# Patient Record
Sex: MALE | Race: BLACK OR AFRICAN AMERICAN | Employment: FULL TIME | URBAN - METROPOLITAN AREA
[De-identification: names, ages, dates, MRNs, and addresses within clinical notes are randomized per-mention and may not be internally consistent; named-entity substitution may affect disease eponyms.]

---

## 2017-08-31 ENCOUNTER — OFFICE VISIT (OUTPATIENT)
Dept: CARDIOLOGY CLINIC | Age: 27
End: 2017-08-31

## 2017-08-31 VITALS
HEART RATE: 60 BPM | WEIGHT: 239.4 LBS | SYSTOLIC BLOOD PRESSURE: 114 MMHG | DIASTOLIC BLOOD PRESSURE: 80 MMHG | BODY MASS INDEX: 32.43 KG/M2 | HEIGHT: 72 IN | OXYGEN SATURATION: 97 % | RESPIRATION RATE: 18 BRPM

## 2017-08-31 DIAGNOSIS — I45.6 WPW (WOLFF-PARKINSON-WHITE SYNDROME): Primary | Chronic | ICD-10-CM

## 2017-08-31 DIAGNOSIS — R00.2 PALPITATIONS: ICD-10-CM

## 2017-08-31 DIAGNOSIS — R06.02 SOB (SHORTNESS OF BREATH): ICD-10-CM

## 2017-08-31 NOTE — PROGRESS NOTES
Subjective:      Earline Yadav is a 23 y.o. male is here for EP consult. He has a hx of WPW with ablation in 2014. He has been doing generally well until recently. He has been having episodes of dizziness with rapid heart beat. He feels palpitations that feel like a racing beat, going really fast. He has felt dizzy, held his breath and it seemed to slow things down. He gets a weird pressure in his chest at times. He has sob associated with this pressure. The patient denies orthopnea, PND, LE edema, syncope, presyncope or fatigue. Patient Active Problem List    Diagnosis Date Noted    SOB (shortness of breath) 08/31/2017    Paroxysmal supraventricular tachycardia ([address] 07/02/2012    Other dyspnea and respiratory abnormality 07/02/2012    WPW (Hunter-Parkinson-White syndrome) 05/31/2012    Angina at rest [facility]) 05/31/2012      None  Past Medical History:   Diagnosis Date    Angina at rest [facility])     WPW (Hunter-Parkinson-White syndrome)       Past Surgical History:   Procedure Laterality Date    CARDIAC SURG PROCEDURE UNLIST  2014    Cardiac ablation ([facility] WPW)     No Known Allergies   Family History   Problem Relation Age of Onset    Arrhythmia Father     negative for cardiac disease  Social History     Social History    Marital status: SINGLE     Spouse name: N/A    Number of children: N/A    Years of education: N/A     Social History Main Topics    Smoking status: Never Smoker    Smokeless tobacco: Never Used      Comment: 1 CIGAR DAILY    Alcohol use Yes      Comment: occasional    Drug use: Yes     Special: Marijuana      Comment: stated he smokes everyday, twice a day    Sexual activity: Yes     Partners: Female     Birth control/ protection: Condom     Other Topics Concern    None     Social History Narrative     Current Outpatient Prescriptions   Medication Sig    HYDROcodone-acetaminophen (NORCO) 5-325 mg per tablet Take 1 Tab by mouth every four (4) hours as needed for Pain.  Max Daily Amount: 6 Tabs.  chlorhexidine (PERIDEX) 0.12 % solution 15 mL by Swish and Spit route two (2) times a day. No current facility-administered medications for this visit. Vitals:    08/31/17 1007 08/31/17 1019 08/31/17 1020   BP: 104/60 110/76 114/80   Pulse: 60     Resp: 18     SpO2: 97%     Weight: 239 lb 6.4 oz (108.6 kg)     Height: 6' (1.829 m)         I have reviewed the nurses notes, vitals, problem list, allergy list, medical history, family, social history and medications. Review of Symptoms:    General: Pt denies excessive weight gain or loss. Pt is able to conduct ADL's  HEENT: Denies blurred vision, headaches, epistaxis and difficulty swallowing. Respiratory: + shortness of breath, denies CHRISTIANSON, wheezing or stridor. Cardiovascular: +chest pressure, Denies precordial pain, +palpitations, denies edema or PND  Gastrointestinal: Denies poor appetite, indigestion, abdominal pain or blood in stool  Urinary: Denies dysuria, pyuria  Musculoskeletal: Denies pain or swelling from muscles or joints  Neurologic: Denies tremor, paresthesias, +dizziness  Skin: Denies rash, itching or texture change. Psych: Denies depression      Physical Exam:      General: Well developed, in no acute distress. HEENT: Eyes - PERRL, no jvd  Heart:  Normal S1/S2 negative S3 or S4. Regular, no murmur, gallop or rub.   Respiratory: Clear bilaterally x 4, no wheezing or rales  Abdomen:   Soft, non-tender, bowel sounds are active.   Extremities:  No edema, normal cap refill, no cyanosis. Musculoskeletal: No clubbing  Neuro: A&Ox3, speech clear, gait stable. Skin: Skin color is normal. No rashes or lesions.  Non diaphoretic  Vascular: 2+ pulses symmetric in all extremities    Cardiographics    Ekg: Sinus  Rhythm HR 65  wpw pattern    Results for orders placed or performed during the hospital encounter of 08/20/14   EKG, 12 LEAD, INITIAL   Result Value Ref Range    Ventricular Rate 75 BPM    Atrial Rate 75 BPM    P-R Interval 122 ms    QRS Duration 88 ms    Q-T Interval 388 ms    QTC Calculation (Bezet) 433 ms    Calculated P Axis 67 degrees    Calculated R Axis 75 degrees    Calculated T Axis 35 degrees    Diagnosis       Normal sinus rhythm  Normal ECG  When compared with ECG of 25-JUN-2014 07:21,  QRS duration has decreased  Confirmed by Darby Dougherty PBERNY ([id number]) on 8/21/2014 12:37:49 PM         Lab Results   Component Value Date/Time    WBC 8.1 08/20/2014 09:48 PM    HGB 12.7 08/20/2014 09:48 PM    HCT 36.9 08/20/2014 09:48 PM    PLATELET 400 50/59/3394 09:48 PM    MCV 83.3 08/20/2014 09:48 PM      Lab Results   Component Value Date/Time    Sodium 140 08/20/2014 09:48 PM    Potassium 3.9 08/20/2014 09:48 PM    Chloride 107 08/20/2014 09:48 PM    CO2 28 08/20/2014 09:48 PM    Anion gap 5 08/20/2014 09:48 PM    Glucose 91 08/20/2014 09:48 PM    BUN 9 08/20/2014 09:48 PM    Creatinine 0.99 08/20/2014 09:48 PM    BUN/Creatinine ratio 9 08/20/2014 09:48 PM    GFR est AA >60 08/20/2014 09:48 PM    GFR est non-AA >60 08/20/2014 09:48 PM    Calcium 8.6 08/20/2014 09:48 PM    Bilirubin, total 0.6 08/20/2014 09:48 PM    AST (SGOT) 24 08/20/2014 09:48 PM    Alk. phosphatase 72 08/20/2014 09:48 PM    Protein, total 7.1 08/20/2014 09:48 PM    Albumin 4.0 08/20/2014 09:48 PM    Globulin 3.1 08/20/2014 09:48 PM    A-G Ratio 1.3 08/20/2014 09:48 PM    ALT (SGPT) 37 08/20/2014 09:48 PM         Assessment:     Assessment:        ICD-10-CM ICD-9-CM    1. WPW (Hunter-Parkinson-White syndrome) I45.6 426.7 AMB POC EKG ROUTINE W/ 12 LEADS, INTER & REP      CBC WITH AUTOMATED DIFF      METABOLIC PANEL, COMPREHENSIVE      PROTHROMBIN TIME + INR   2. SOB (shortness of breath) R06.02 786.05 AMB POC EKG ROUTINE W/ 12 LEADS, INTER & REP      CBC WITH AUTOMATED DIFF      METABOLIC PANEL, COMPREHENSIVE      PROTHROMBIN TIME + INR   3.  Palpitations R00.2 785.1 AMB POC EKG ROUTINE W/ 12 LEADS, INTER & REP      CBC WITH AUTOMATED DIFF      METABOLIC PANEL, COMPREHENSIVE      PROTHROMBIN TIME + INR     Orders Placed This Encounter    CBC WITH AUTOMATED DIFF    METABOLIC PANEL, COMPREHENSIVE    PROTHROMBIN TIME + INR    AMB POC EKG ROUTINE W/ 12 LEADS, INTER & REP     Order Specific Question:   Reason for Exam:     Answer:   routine        Plan:   Mr Shanta Marquez is here today for consultation regarding rapid palpitations associated with dizziness, sob and chest pressure. He has a hx of WPW with successful ablation in 2014. His ekg today is showing a WPW pattern. He is a candidate for a redo ablation. I discussed the risks/benefits/alternatives of the procedure with the patient. Risks include (but are not limited to) bleeding, infection, cva/mi/tamponade/death. The patient understands and agrees to proceed. Thank you for this interesting consultation. Thank you for allowing me to participate in Rut Black 's care.     Gabriel Rondon MD, Natalee Rodriguez

## 2017-08-31 NOTE — PROGRESS NOTES
Chief Complaint   Patient presents with    Shortness of Breath     SOB and palps. C/o lightheadedness.

## 2017-08-31 NOTE — MR AVS SNAPSHOT
Visit Information Date & Time Provider Department Dept. Phone Encounter #  
 8/31/2017  9:45 AM He Iniguez, [address] [facility] [phone] [id number] Upcoming Health Maintenance Date Due DTaP/Tdap/Td series (1 - Tdap) 11/13/2012 INFLUENZA AGE 9 TO ADULT 8/1/2017 Allergies as of 8/31/2017  Review Complete On: 8/31/2017 By: Reva Cifuentes NP No Known Allergies Current Immunizations  Never Reviewed No immunizations on file. Not reviewed this visit You Were Diagnosed With   
  
 Codes Comments WPW (Hunter-Parkinson-White syndrome)    -  Primary ICD-10-CM: I45.6 ICD-9-CM: 426.7 SOB (shortness of breath)     ICD-10-CM: R06.02 
ICD-9-CM: 786.05 Palpitations     ICD-10-CM: R00.2 ICD-9-CM: 785.1 Vitals BP Pulse Resp Height(growth percentile) Weight(growth percentile) SpO2  
 114/80 (BP 1 Location: Right arm, BP Patient Position: Standing) 60 18 6' (1.829 m) 239 lb 6.4 oz (108.6 kg) 97% BMI Smoking Status 32.47 kg/m2 Never Smoker Vitals History BMI and BSA Data Body Mass Index Body Surface Area  
 32.47 kg/m 2 2.35 m 2 Your Updated Medication List  
  
   
This list is accurate as of: 8/31/17 10:46 AM.  Always use your most recent med list.  
  
  
  
  
 chlorhexidine 0.12 % solution Commonly known as:  PERIDEX 15 mL by Swish and Spit route two (2) times a day. HYDROcodone-acetaminophen 5-325 mg per tablet Commonly known as:  Rhenda Cunningham Take 1 Tab by mouth every four (4) hours as needed for Pain. Max Daily Amount: 6 Tabs. We Performed the Following AMB POC EKG ROUTINE W/ 12 LEADS, INTER & REP [62368 CPT(R)] CBC WITH AUTOMATED DIFF [13413 CPT(R)] METABOLIC PANEL, COMPREHENSIVE [36890 CPT(R)] PROTHROMBIN TIME + INR [59463 CPT(R)] Introducing [facility]!    
 [facility] introduces [employer] patient portal. Now you can access parts of your medical record, email your doctor's office, and request medication refills online. 1. In your internet browser, go to [email or web address] 2. Click on the First Time User? Click Here link in the Sign In box. You will see the New Member Sign Up page. 3. Enter your [employer] Access Code exactly as it appears below. You will not need to use this code after youve completed the sign-up process. If you do not sign up before the expiration date, you must request a new code. · [employer] Access Code: [email or web address] Expires: 11/29/2017  8:30 AM 
 
4. Enter the last four digits of your Social Security Number (xxxx) and Date of Birth (mm/dd/yyyy) as indicated and click Submit. You will be taken to the next sign-up page. 5. Create a [employer] ID. This will be your [employer] login ID and cannot be changed, so think of one that is secure and easy to remember. 6. Create a [employer] password. You can change your password at any time. 7. Enter your Password Reset Question and Answer. This can be used at a later time if you forget your password. 8. Enter your e-mail address. You will receive e-mail notification when new information is available in [address] 9. Click Sign Up. You can now view and download portions of your medical record. 10. Click the Download Summary menu link to download a portable copy of your medical information. If you have questions, please visit the Frequently Asked Questions section of the [employer] website. Remember, [employer] is NOT to be used for urgent needs. For medical emergencies, dial 911. Now available from your iPhone and Android! Please provide this summary of care documentation to your next provider. Your primary care clinician is listed as NONE. If you have any questions after today's visit, please call [phone].

## 2017-09-06 LAB
ALBUMIN SERPL-MCNC: 4.3 G/DL (ref 3.5–5.5)
ALBUMIN/GLOB SERPL: 1.6 {RATIO} (ref 1.2–2.2)
ALP SERPL-CCNC: 66 IU/L (ref 39–117)
ALT SERPL-CCNC: 23 IU/L (ref 0–44)
AST SERPL-CCNC: 18 IU/L (ref 0–40)
BASOPHILS # BLD AUTO: 0 X10E3/UL (ref 0–0.2)
BASOPHILS NFR BLD AUTO: 0 %
BILIRUB SERPL-MCNC: 0.7 MG/DL (ref 0–1.2)
BUN SERPL-MCNC: 10 MG/DL (ref 6–20)
BUN/CREAT SERPL: 10 (ref 9–20)
CALCIUM SERPL-MCNC: 9.2 MG/DL (ref 8.7–10.2)
CHLORIDE SERPL-SCNC: 102 MMOL/L (ref 96–106)
CO2 SERPL-SCNC: 23 MMOL/L (ref 18–29)
CREAT SERPL-MCNC: 1.04 MG/DL (ref 0.76–1.27)
EOSINOPHIL # BLD AUTO: 0.1 X10E3/UL (ref 0–0.4)
EOSINOPHIL NFR BLD AUTO: 2 %
ERYTHROCYTE [DISTWIDTH] IN BLOOD BY AUTOMATED COUNT: 12.6 % (ref 12.3–15.4)
GLOBULIN SER CALC-MCNC: 2.7 G/DL (ref 1.5–4.5)
GLUCOSE SERPL-MCNC: 118 MG/DL (ref 65–99)
HCT VFR BLD AUTO: 41.7 % (ref 37.5–51)
HGB BLD-MCNC: 13.7 G/DL (ref 12.6–17.7)
IMM GRANULOCYTES # BLD: 0 X10E3/UL (ref 0–0.1)
IMM GRANULOCYTES NFR BLD: 0 %
INR PPP: 1.1 (ref 0.8–1.2)
LYMPHOCYTES # BLD AUTO: 2.7 X10E3/UL (ref 0.7–3.1)
LYMPHOCYTES NFR BLD AUTO: 40 %
MCH RBC QN AUTO: 28.9 PG (ref 26.6–33)
MCHC RBC AUTO-ENTMCNC: 32.9 G/DL (ref 31.5–35.7)
MCV RBC AUTO: 88 FL (ref 79–97)
MONOCYTES # BLD AUTO: 0.4 X10E3/UL (ref 0.1–0.9)
MONOCYTES NFR BLD AUTO: 6 %
NEUTROPHILS # BLD AUTO: 3.5 X10E3/UL (ref 1.4–7)
NEUTROPHILS NFR BLD AUTO: 52 %
PLATELET # BLD AUTO: 293 X10E3/UL (ref 150–379)
POTASSIUM SERPL-SCNC: 4.2 MMOL/L (ref 3.5–5.2)
PROT SERPL-MCNC: 7 G/DL (ref 6–8.5)
PROTHROMBIN TIME: 11.8 SEC (ref 9.1–12)
RBC # BLD AUTO: 4.74 X10E6/UL (ref 4.14–5.8)
SODIUM SERPL-SCNC: 139 MMOL/L (ref 134–144)
WBC # BLD AUTO: 6.8 X10E3/UL (ref 3.4–10.8)

## 2017-09-11 ENCOUNTER — ANESTHESIA (OUTPATIENT)
Dept: NON INVASIVE DIAGNOSTICS | Age: 27
End: 2017-09-11
Payer: SELF-PAY

## 2017-09-11 ENCOUNTER — ANESTHESIA EVENT (OUTPATIENT)
Dept: NON INVASIVE DIAGNOSTICS | Age: 27
End: 2017-09-11
Payer: SELF-PAY

## 2017-09-11 ENCOUNTER — HOSPITAL ENCOUNTER (OUTPATIENT)
Dept: NON INVASIVE DIAGNOSTICS | Age: 27
Setting detail: OBSERVATION
Discharge: HOME | End: 2017-09-12
Attending: INTERNAL MEDICINE | Admitting: INTERNAL MEDICINE
Payer: SELF-PAY

## 2017-09-11 PROBLEM — I47.1 SVT (SUPRAVENTRICULAR TACHYCARDIA) (HCC): Status: ACTIVE | Noted: 2017-09-11

## 2017-09-11 LAB
ACT BLD: 131 SECS (ref 79–138)
ACT BLD: 329 SECS (ref 79–138)
ACT BLD: 367 SECS (ref 79–138)
ACT BLD: 389 SECS (ref 79–138)

## 2017-09-11 PROCEDURE — C2630 CATH, EP, COOL-TIP: HCPCS

## 2017-09-11 PROCEDURE — 74011250636 HC RX REV CODE- 250/636

## 2017-09-11 PROCEDURE — 77030013797 HC KT TRNSDUC PRSSR EDWD -A

## 2017-09-11 PROCEDURE — 74011000250 HC RX REV CODE- 250

## 2017-09-11 PROCEDURE — 77030030806 HC PTCH ENSIT NAVX STJU -G

## 2017-09-11 PROCEDURE — 77030025143 HC CBL EP SUPRME 1 STJU -C

## 2017-09-11 PROCEDURE — C1894 INTRO/SHEATH, NON-LASER: HCPCS

## 2017-09-11 PROCEDURE — 77030004964 HC CBL EP ABLAT BSC -C

## 2017-09-11 PROCEDURE — 77030029065 HC DRSG HEMO QCLOT ZMED -B

## 2017-09-11 PROCEDURE — 77030033352 HC TBNG IRR PMP COOL PNT STJU -B

## 2017-09-11 PROCEDURE — 77030016898 HC CBL EP EXT3 STJU -B

## 2017-09-11 PROCEDURE — 74011250637 HC RX REV CODE- 250/637: Performed by: INTERNAL MEDICINE

## 2017-09-11 PROCEDURE — 85347 COAGULATION TIME ACTIVATED: CPT

## 2017-09-11 PROCEDURE — 77030010880 HC CBL EP SUPRME STJU -C

## 2017-09-11 PROCEDURE — 99218 HC RM OBSERVATION: CPT

## 2017-09-11 PROCEDURE — C1759 CATH, INTRA ECHOCARDIOGRAPHY: HCPCS

## 2017-09-11 PROCEDURE — 77030011640 HC PAD GRND REM COVD -A

## 2017-09-11 PROCEDURE — C1730 CATH, EP, 19 OR FEW ELECT: HCPCS

## 2017-09-11 PROCEDURE — 74011250636 HC RX REV CODE- 250/636: Performed by: INTERNAL MEDICINE

## 2017-09-11 PROCEDURE — 77030016894 HC CBL EP DX CATH3 STJU -B

## 2017-09-11 PROCEDURE — 76060000037 HC ANESTHESIA 3 TO 3.5 HR

## 2017-09-11 PROCEDURE — 93613 INTRACARDIAC EPHYS 3D MAPG: CPT

## 2017-09-11 PROCEDURE — C1769 GUIDE WIRE: HCPCS

## 2017-09-11 PROCEDURE — 77030018729 HC ELECTRD DEFIB PAD CARD -B

## 2017-09-11 PROCEDURE — 93655 ICAR CATH ABLTJ DSCRT ARRHYT: CPT

## 2017-09-11 PROCEDURE — C1766 INTRO/SHEATH,STRBLE,NON-PEEL: HCPCS

## 2017-09-11 PROCEDURE — 93656 COMPRE EP EVAL ABLTJ ATR FIB: CPT

## 2017-09-11 PROCEDURE — C1893 INTRO/SHEATH, FIXED,NON-PEEL: HCPCS

## 2017-09-11 RX ORDER — METOPROLOL SUCCINATE 25 MG/1
12.5 TABLET, EXTENDED RELEASE ORAL DAILY
Qty: 15 TAB | Refills: 2 | Status: SHIPPED | OUTPATIENT
Start: 2017-09-11 | End: 2017-09-13

## 2017-09-11 RX ORDER — PROPOFOL 10 MG/ML
INJECTION, EMULSION INTRAVENOUS AS NEEDED
Status: DISCONTINUED | OUTPATIENT
Start: 2017-09-11 | End: 2017-09-11 | Stop reason: HOSPADM

## 2017-09-11 RX ORDER — PROTAMINE SULFATE 10 MG/ML
INJECTION, SOLUTION INTRAVENOUS AS NEEDED
Status: DISCONTINUED | OUTPATIENT
Start: 2017-09-11 | End: 2017-09-11 | Stop reason: HOSPADM

## 2017-09-11 RX ORDER — MIDAZOLAM HYDROCHLORIDE 1 MG/ML
1-5 INJECTION, SOLUTION INTRAMUSCULAR; INTRAVENOUS
Status: DISCONTINUED | OUTPATIENT
Start: 2017-09-11 | End: 2017-09-11

## 2017-09-11 RX ORDER — FENTANYL CITRATE 50 UG/ML
INJECTION, SOLUTION INTRAMUSCULAR; INTRAVENOUS AS NEEDED
Status: DISCONTINUED | OUTPATIENT
Start: 2017-09-11 | End: 2017-09-11 | Stop reason: HOSPADM

## 2017-09-11 RX ORDER — DOBUTAMINE HYDROCHLORIDE 200 MG/100ML
INJECTION INTRAVENOUS
Status: COMPLETED
Start: 2017-09-11 | End: 2017-09-11

## 2017-09-11 RX ORDER — FLECAINIDE ACETATE 100 MG/1
300 TABLET ORAL
Status: COMPLETED | OUTPATIENT
Start: 2017-09-11 | End: 2017-09-11

## 2017-09-11 RX ORDER — PROPOFOL 10 MG/ML
INJECTION, EMULSION INTRAVENOUS
Status: DISCONTINUED | OUTPATIENT
Start: 2017-09-11 | End: 2017-09-11 | Stop reason: HOSPADM

## 2017-09-11 RX ORDER — HEPARIN SODIUM 1000 [USP'U]/ML
INJECTION, SOLUTION INTRAVENOUS; SUBCUTANEOUS
Status: DISCONTINUED
Start: 2017-09-11 | End: 2017-09-11

## 2017-09-11 RX ORDER — FLECAINIDE ACETATE 50 MG/1
50 TABLET ORAL EVERY 12 HOURS
Qty: 60 TAB | Refills: 2 | Status: SHIPPED | OUTPATIENT
Start: 2017-09-11 | End: 2017-09-13

## 2017-09-11 RX ORDER — HEPARIN SODIUM 200 [USP'U]/100ML
INJECTION, SOLUTION INTRAVENOUS
Status: COMPLETED
Start: 2017-09-11 | End: 2017-09-11

## 2017-09-11 RX ORDER — LIDOCAINE HYDROCHLORIDE 10 MG/ML
1-40 INJECTION INFILTRATION; PERINEURAL
Status: DISCONTINUED | OUTPATIENT
Start: 2017-09-11 | End: 2017-09-11

## 2017-09-11 RX ORDER — LIDOCAINE HYDROCHLORIDE 20 MG/ML
INJECTION, SOLUTION EPIDURAL; INFILTRATION; INTRACAUDAL; PERINEURAL AS NEEDED
Status: DISCONTINUED | OUTPATIENT
Start: 2017-09-11 | End: 2017-09-11 | Stop reason: HOSPADM

## 2017-09-11 RX ORDER — SODIUM CHLORIDE 0.9 % (FLUSH) 0.9 %
5-10 SYRINGE (ML) INJECTION AS NEEDED
Status: DISCONTINUED | OUTPATIENT
Start: 2017-09-11 | End: 2017-09-12 | Stop reason: HOSPADM

## 2017-09-11 RX ORDER — MIDAZOLAM HYDROCHLORIDE 1 MG/ML
INJECTION, SOLUTION INTRAMUSCULAR; INTRAVENOUS AS NEEDED
Status: DISCONTINUED | OUTPATIENT
Start: 2017-09-11 | End: 2017-09-11 | Stop reason: HOSPADM

## 2017-09-11 RX ORDER — ONDANSETRON 2 MG/ML
INJECTION INTRAMUSCULAR; INTRAVENOUS AS NEEDED
Status: DISCONTINUED | OUTPATIENT
Start: 2017-09-11 | End: 2017-09-11 | Stop reason: HOSPADM

## 2017-09-11 RX ORDER — LIDOCAINE HYDROCHLORIDE 10 MG/ML
INJECTION INFILTRATION; PERINEURAL
Status: COMPLETED
Start: 2017-09-11 | End: 2017-09-11

## 2017-09-11 RX ORDER — HEPARIN SODIUM 1000 [USP'U]/ML
INJECTION, SOLUTION INTRAVENOUS; SUBCUTANEOUS AS NEEDED
Status: DISCONTINUED | OUTPATIENT
Start: 2017-09-11 | End: 2017-09-11 | Stop reason: HOSPADM

## 2017-09-11 RX ORDER — DOBUTAMINE HYDROCHLORIDE 200 MG/100ML
2.5-1 INJECTION INTRAVENOUS
Status: DISCONTINUED | OUTPATIENT
Start: 2017-09-11 | End: 2017-09-11

## 2017-09-11 RX ORDER — SODIUM CHLORIDE 0.9 % (FLUSH) 0.9 %
5-10 SYRINGE (ML) INJECTION EVERY 8 HOURS
Status: DISCONTINUED | OUTPATIENT
Start: 2017-09-11 | End: 2017-09-12 | Stop reason: HOSPADM

## 2017-09-11 RX ORDER — HEPARIN SODIUM 200 [USP'U]/100ML
500 INJECTION, SOLUTION INTRAVENOUS ONCE
Status: COMPLETED | OUTPATIENT
Start: 2017-09-11 | End: 2017-09-11

## 2017-09-11 RX ORDER — METOPROLOL SUCCINATE 25 MG/1
12.5 TABLET, EXTENDED RELEASE ORAL DAILY
Status: DISCONTINUED | OUTPATIENT
Start: 2017-09-11 | End: 2017-09-12 | Stop reason: HOSPADM

## 2017-09-11 RX ORDER — ACETAMINOPHEN 325 MG/1
650 TABLET ORAL
Status: DISCONTINUED | OUTPATIENT
Start: 2017-09-11 | End: 2017-09-12 | Stop reason: HOSPADM

## 2017-09-11 RX ORDER — SODIUM CHLORIDE 9 MG/ML
INJECTION, SOLUTION INTRAVENOUS
Status: DISCONTINUED | OUTPATIENT
Start: 2017-09-11 | End: 2017-09-11 | Stop reason: HOSPADM

## 2017-09-11 RX ORDER — HYDROCODONE BITARTRATE AND ACETAMINOPHEN 5; 325 MG/1; MG/1
1 TABLET ORAL
Status: DISCONTINUED | OUTPATIENT
Start: 2017-09-11 | End: 2017-09-12 | Stop reason: HOSPADM

## 2017-09-11 RX ORDER — HEPARIN SODIUM 10000 [USP'U]/100ML
INJECTION, SOLUTION INTRAVENOUS
Status: DISCONTINUED
Start: 2017-09-11 | End: 2017-09-11

## 2017-09-11 RX ORDER — FLECAINIDE ACETATE 100 MG/1
50 TABLET ORAL EVERY 12 HOURS
Status: DISCONTINUED | OUTPATIENT
Start: 2017-09-11 | End: 2017-09-12 | Stop reason: HOSPADM

## 2017-09-11 RX ORDER — PROTAMINE SULFATE 10 MG/ML
INJECTION, SOLUTION INTRAVENOUS
Status: DISCONTINUED
Start: 2017-09-11 | End: 2017-09-11

## 2017-09-11 RX ORDER — HEPARIN SODIUM 1000 [USP'U]/ML
INJECTION, SOLUTION INTRAVENOUS; SUBCUTANEOUS
Status: DISCONTINUED | OUTPATIENT
Start: 2017-09-11 | End: 2017-09-11 | Stop reason: HOSPADM

## 2017-09-11 RX ORDER — FENTANYL CITRATE 50 UG/ML
12.5-5 INJECTION, SOLUTION INTRAMUSCULAR; INTRAVENOUS
Status: DISCONTINUED | OUTPATIENT
Start: 2017-09-11 | End: 2017-09-11

## 2017-09-11 RX ADMIN — PROPOFOL 30 MG: 10 INJECTION, EMULSION INTRAVENOUS at 11:38

## 2017-09-11 RX ADMIN — HEPARIN SODIUM 3000 UNITS: 200 INJECTION, SOLUTION INTRAVENOUS at 11:44

## 2017-09-11 RX ADMIN — PROPOFOL 10 MG: 10 INJECTION, EMULSION INTRAVENOUS at 12:21

## 2017-09-11 RX ADMIN — LIDOCAINE HYDROCHLORIDE 20 MG: 20 INJECTION, SOLUTION EPIDURAL; INFILTRATION; INTRACAUDAL; PERINEURAL at 11:17

## 2017-09-11 RX ADMIN — PROPOFOL 30 MG: 10 INJECTION, EMULSION INTRAVENOUS at 12:37

## 2017-09-11 RX ADMIN — LIDOCAINE HYDROCHLORIDE 10 ML: 10 INJECTION, SOLUTION INFILTRATION; PERINEURAL at 11:40

## 2017-09-11 RX ADMIN — DOBUTAMINE HYDROCHLORIDE 5 MCG/KG/MIN: 200 INJECTION INTRAVENOUS at 13:30

## 2017-09-11 RX ADMIN — HEPARIN SODIUM 5000 UNITS/HR: 1000 INJECTION, SOLUTION INTRAVENOUS; SUBCUTANEOUS at 11:54

## 2017-09-11 RX ADMIN — FLECAINIDE ACETATE 50 MG: 100 TABLET ORAL at 21:29

## 2017-09-11 RX ADMIN — FENTANYL CITRATE 50 MCG: 50 INJECTION, SOLUTION INTRAMUSCULAR; INTRAVENOUS at 11:19

## 2017-09-11 RX ADMIN — MIDAZOLAM HYDROCHLORIDE 1 MG: 1 INJECTION, SOLUTION INTRAMUSCULAR; INTRAVENOUS at 12:37

## 2017-09-11 RX ADMIN — PROPOFOL 40 MG: 10 INJECTION, EMULSION INTRAVENOUS at 13:20

## 2017-09-11 RX ADMIN — HEPARIN SODIUM 15000 UNITS: 1000 INJECTION, SOLUTION INTRAVENOUS; SUBCUTANEOUS at 11:54

## 2017-09-11 RX ADMIN — PROPOFOL 30 MG: 10 INJECTION, EMULSION INTRAVENOUS at 12:32

## 2017-09-11 RX ADMIN — PROPOFOL 30 MG: 10 INJECTION, EMULSION INTRAVENOUS at 11:42

## 2017-09-11 RX ADMIN — SODIUM CHLORIDE: 9 INJECTION, SOLUTION INTRAVENOUS at 11:13

## 2017-09-11 RX ADMIN — FENTANYL CITRATE 25 MCG: 50 INJECTION, SOLUTION INTRAMUSCULAR; INTRAVENOUS at 14:25

## 2017-09-11 RX ADMIN — PROPOFOL 30 MG: 10 INJECTION, EMULSION INTRAVENOUS at 12:56

## 2017-09-11 RX ADMIN — MIDAZOLAM HYDROCHLORIDE 2 MG: 1 INJECTION, SOLUTION INTRAMUSCULAR; INTRAVENOUS at 11:16

## 2017-09-11 RX ADMIN — MIDAZOLAM HYDROCHLORIDE 1 MG: 1 INJECTION, SOLUTION INTRAMUSCULAR; INTRAVENOUS at 11:30

## 2017-09-11 RX ADMIN — FLECAINIDE ACETATE 300 MG: 100 TABLET ORAL at 16:49

## 2017-09-11 RX ADMIN — LIDOCAINE HYDROCHLORIDE 10 ML: 10 INJECTION INFILTRATION; PERINEURAL at 11:40

## 2017-09-11 RX ADMIN — PROPOFOL 30 MG: 10 INJECTION, EMULSION INTRAVENOUS at 13:29

## 2017-09-11 RX ADMIN — FENTANYL CITRATE 25 MCG: 50 INJECTION, SOLUTION INTRAMUSCULAR; INTRAVENOUS at 16:13

## 2017-09-11 RX ADMIN — PROPOFOL 50 MCG/KG/MIN: 10 INJECTION, EMULSION INTRAVENOUS at 11:17

## 2017-09-11 RX ADMIN — PROPOFOL 40 MG: 10 INJECTION, EMULSION INTRAVENOUS at 13:03

## 2017-09-11 RX ADMIN — SODIUM CHLORIDE: 9 INJECTION, SOLUTION INTRAVENOUS at 13:00

## 2017-09-11 RX ADMIN — FENTANYL CITRATE 50 MCG: 50 INJECTION, SOLUTION INTRAMUSCULAR; INTRAVENOUS at 11:16

## 2017-09-11 RX ADMIN — FENTANYL CITRATE 25 MCG: 50 INJECTION, SOLUTION INTRAMUSCULAR; INTRAVENOUS at 13:05

## 2017-09-11 RX ADMIN — Medication 10 ML: at 16:14

## 2017-09-11 RX ADMIN — METOPROLOL SUCCINATE 12.5 MG: 25 TABLET, EXTENDED RELEASE ORAL at 16:15

## 2017-09-11 RX ADMIN — PROTAMINE SULFATE 100 MG: 10 INJECTION, SOLUTION INTRAVENOUS at 13:35

## 2017-09-11 RX ADMIN — HEPARIN SODIUM 2000 UNITS: 1000 INJECTION, SOLUTION INTRAVENOUS; SUBCUTANEOUS at 12:50

## 2017-09-11 RX ADMIN — Medication 10 ML: at 21:31

## 2017-09-11 RX ADMIN — FENTANYL CITRATE 25 MCG: 50 INJECTION, SOLUTION INTRAMUSCULAR; INTRAVENOUS at 13:10

## 2017-09-11 RX ADMIN — ONDANSETRON 4 MG: 2 INJECTION INTRAMUSCULAR; INTRAVENOUS at 11:18

## 2017-09-11 NOTE — PROCEDURES
[address] [address]  [phone]    Indications and Pre-Procedure Diagnosis:  Olivier Webber is a 23 y.o. male with WPW and palpitations is referred for electr-physiologic evaluation and intervention. Post Procedure Diagnosis    AVRT (RLAP)  AVRT (LLAP)    SVT Ablation Summary  Informed consent was obtained. The patient was brought to the EP lab in fasting condition. All vascular access sites were prepped and draped in the usual sterile fashion and the Seldinger technique was used to catherize the RFV and LFV with multi-polar electrode catheters, which were placed in the appropriate intra-cardiac sites under fluoroscopic guidance (see catheter list). Right and left atrial pacing and recording, His bundle recording, and right ventricular pacing and recording were performed. Continuous pulse oximetry and cuff BP monitoring were performed. During the procedure, the patient received Versed, Fentanyl and Propofol for sedation per anesthesia personnel. Transeptal Puncture  A transeptal atrial puncture was performed using fluoroscopic and intracardiac ultrasound guidance. An SL1 sheath was dragged from the SVC along the septum until a sudden leftward displacement was observed. Engagement of the Fossa Ovalis was confirmed by the interatrial tenting seen under intracardiac ultrasound guidance. The [employer] Gustavo [employer]G needle was advanced into the left atrium and its positioned confirmed with contrast injection. The dilator and sheath were advanced carefully over the needle into the body of the left atrium and the dilator/needle removed. An Agilis sheath was exchanged over the wire for the SL1 sheath.  No pericardial effusion was appreciated on intracardiac ultrasound so a bolus injection of heparin 100 units/kg was administered, with a continuous heparin gtt of 5000 units/hr so that the activated clotting time maintained was between 300 and 400 seconds with additional boluses q 30 minutes as necessary. A 3D shell representing the left atrium and pulmonary veins was constructed with the electroanatomic mapping system. Ventricular pacing demonstrated an AP at the anterior mid CS. A Flexibility large curve 8Fr/4mm catheter was then advanced into the left atrium and RFA was applied to the anterior mid CS during ventricular pacing until the AP was no longer conduction. Autonomic manipulation with Dobutamine @ 5 mcg/min was performed during this procedure. Post ablation, rapid atrial pacing to 400 msec, induced AVRT with a cycle length of 369 msec with an AP mapped to the anterolateral right atrium. The RF catheter was moved to the site where [place] fused and RFA would terminate the tachycardia but atrial pacing would reinduce AVRT. After several RFAs at this site, it was decided to terminate the procedure thinking that this AP may be epicardial in nature. Radiofrequency energy was applied with a target power of 25-35W. Local sites were targeted until the local electrogram amplitude decreased to <0.15mv or by >50%. At the end of the procedure, all catheters were removed, heparin reversed, groin sheaths pulled and hemostasis achieved. The patient left the laboratory in a stable condition. Fluoroscopy and procedure times were 50 and 120 minutes respectively. Estimated blood loss: <10 ml. Sharp counts: correct. Specimen (s) collected: none. The procedure related complication occurred: none. The following problems were encountered: none. Conduction intervals (ms)    A-A A-H H-V P-R QRS Q-T R-R V-V  813 [phone]    AV mario conduction    VA Block when pacing at 310 ms    AV Wenckebach when pacing at 350 ms    Findings and Summary    This study demonstrates:  1. Successful RFA of the anterolateral left AP  2. AVRT with RAL AP with RFA at the site but easy reinducibility    Recommendation:  1. Low dose toprol and flecainide  2.  If recurrence, consider referral to [facility]    Thank you for allowing me to participate in this patients care.     Annelise Jo MD, [place]

## 2017-09-11 NOTE — IP AVS SNAPSHOT
Summary of Care Report The Summary of Care report has been created to help improve care coordination. Users with access to [employer] or [employer] [address] (Web-based application) may access additional patient information including the Discharge Summary. If you are not currently a [facility] user and need more information, please call the number listed below in the [address] section and ask to be connected with Medical Records. Facility Information Name Address Phone [address] [address] [phone] Patient Information Patient Name Sex  Urbano Patel ([id number]) Male 1990 Discharge Information Admitting Provider Service Area Unit Byron Harris MD / [phone] [address] [facility] [place]  / [phone] Discharge Provider Discharge Date/Time Discharge Disposition Destination (none) 2017 Morning (Pending) AHR (none) Patient Language Language ENGLISH [13] Hospital Problems as of 2017  Reviewed: 2017 10:48 AM by Byron Harris MD  
  
  
  
 Class Noted - Resolved Last Modified POA Active Problems SVT (supraventricular tachycardia) ([facility]  2017 - Present 2017 by Byron Harris MD Unknown Entered by Byron Harris MD  
  
Non-Hospital Problems as of 2017  Reviewed: 2017 10:48 AM by Byron Harris MD  
  
  
  
 Class Noted - Resolved Last Modified Active Problems WPW (Hunter-Parkinson-White syndrome) (Chronic)  2012 - Present 2012 by Byron Harris MD  
  Entered by Byron Harris MD  
  Angina at rest [facility])  2012 - Present 2012 by Byron Harris MD  
  Entered by Byron Harris MD  
  Paroxysmal supraventricular tachycardia ([address]  2012 - Present 2012 by Melissa Morrissey Entered by Bello Adams Other dyspnea and respiratory abnormality  7/2/2012 - Present 7/2/2012 by Bello Adams Entered by Bello Adams  
  SOB (shortness of breath)  8/31/2017 - Present 8/31/2017 by Kathie Montanez LPN Entered by Kathie Montanez LPN You are allergic to the following No active allergies Current Discharge Medication List  
  
START taking these medications Dose & Instructions Dispensing Information Comments  
 flecainide 50 mg tablet Commonly known as:  TAMBOCOR Dose:  50 mg Take 1 Tab by mouth every twelve (12) hours for 30 days. Quantity:  60 Tab Refills:  2  
   
 metoprolol succinate 25 mg XL tablet Commonly known as:  TOPROL-XL Dose:  12.5 mg Take 0.5 Tabs by mouth daily for 30 days. Quantity:  15 Tab Refills:  2 CONTINUE these medications which have NOT CHANGED Dose & Instructions Dispensing Information Comments  
 chlorhexidine 0.12 % solution Commonly known as:  PERIDEX Dose:  15 mL  
15 mL by Swish and Spit route two (2) times a day. Quantity:  420 mL Refills:  0 HYDROcodone-acetaminophen 5-325 mg per tablet Commonly known as:  Marvelyn Code Dose:  1 Tab Take 1 Tab by mouth every four (4) hours as needed for Pain. Max Daily Amount: 6 Tabs. Quantity:  20 Tab Refills:  0 Surgery Information ID Date/Time Status Primary Surgeon All Procedures Location [id number] 9/11/2017 Posted   BIANCA DANG - DO NOT SCHEDULE Follow-up Information Follow up With Details Comments Contact Info None   None (395) Patient stated that they have no PCP Discharge Instructions [address] [address]  [phone] ABLATION DISCHARGE INSTRUCTIONS Patient ID: 
Radhika Jara [id number] 
43 y.o. 
1990 Admit Date: 9/11/2017 Discharge Date: 9/11/2017 Admitting Physician: Amina Santana MD  
 
 Discharge Physician: Jem Felix MD 
 
Admission Diagnoses: SVT 
SVT (supraventricular tachycardia) ([facility]) Discharge Diagnoses: Active Problems: 
  SVT (supraventricular tachycardia) ([address] (9/11/2017) Discharge Condition: Good Cardiology Procedures this Admission:  AVRT ablation Disposition: home Reference discharge instructions provided by nursing for diet and activity. Follow-up with Dr Nguyễn Limon in one week. Call [phone] to make an appointment. Signed: 
Jem Felix MD 
9/11/2017 
2:20 PM 
 
S/P ABLATION DISCHARGE INSTRUCTIONS It is normal to feel tired the first couple days. Take it easy and follow the physicians instructions. CHECK THE CATHETER INSERTION SITE DAILY: 
You may shower 24 hours after the procedure, remove the bandage during showering. Wash with soap and water and pat dry. Gentle cleaning of the site with soap and water is sufficient, cover with a dry clean dressing or bandage. Do not apply creams or powders to the area. Do not sit in a bathtub or pool of water for 7 days or until wound has completely healed. Temporary bruising and discomfort is normal and may last a few weeks. You may have a  formation of a small lump at the site which may last up to 6 weeks. CALL THE PHYSICIAN: If the site becomes red, swollen or feels warm to the touch If there is bleeding or drainage or if there is unusual pain at the groin or down the leg. If there is any bleeding, lie down, apply pressure or have someone apply pressure with a clean cloth until the bleeding stops. If the bleeding continues, call 911 to be transported to the hospital. 
DO  [facility] 996. Activity: For the first 24-48 hours or as instructed by the physician: No lifting, pushing or pulling over 5 pounds and no straining the insertion site.  Do not life grocery bags or the garbage can, do not run the vacuum  or  for 7 days. Start with short walks as in the hospital and gradually increase as tolerated each day. It is recommended to walk 30 minutes 5-7 days per week. Follow your physicians instructions on activity. Avoid walking outside in extremes of heat or cold. Walk inside when it is cold and windy or hot and humid. Things to keep in mind: 
No driving for at least 5 days, or as designated by your physician. Limit the number of times you go up and down the stairs Take rests and pace yourself with activity. Be careful and do not strain with bowel movements. Medications: Take all medications as prescribed Call your physician if you have any questions Keep an updated list of your medications with you at all times and give a list to your physician and pharmacist 
 
Signs and Symptoms: 
Be cautious of symptoms of angina or recurrent symptoms such as chest discomfort, unusual shortness of breath or fatigue, palpitations. After Care: Follow up with your physician as instructed. Follow a heart healthy diet with proper portion control, daily stress management, daily exercise, blood pressure and cholesterol control , and smoking cessation. Chart Review Routing History Recipient Method Report Sent By Rosalina Donovan MD  
Phone: [phone] In [facility] EKG CUSTOM REPORT Sanjana Gibbs [[id number]] 8/23/2012 12:39 PM 08/23/2012 Be Diego MD  
Phone: [phone] In [facility] EKG CUSTOM REPORT Sanjana Gibbs [[id number]] 8/23/2012 12:39 PM 08/23/2012 Thien Valencia RN Phone: [phone] [facility][id number] 6/25/2014  1:20 PM 06/25/2014

## 2017-09-11 NOTE — IP AVS SNAPSHOT
[address] [facility] 
[phone] Patient: Robert Bolden MRN: [id number] [facility]:55/65/2105 You are allergic to the following No active allergies Recent Documentation Height Weight BMI Smoking Status 1.854 m 108.9 kg 31.66 kg/m2 Never Smoker Emergency Contacts Name Discharge Info Relation Home Work Mobile Ashia Barth DISCHARGE CAREGIVER [3] Parent [1] [phone] About your hospitalization You were admitted on:  September 11, 2017 [facility]  [facility] You were discharged on:  September 12, 2017 Unit phone number:  [phone] Why you were hospitalized Your primary diagnosis was:  Not on File Your diagnoses also included:  Svt (Supraventricular Tachycardia) (Hcc) Providers Seen During Your Hospitalizations Provider Role Specialty Primary office phone Be Diego MD Attending Provider Cardiology [phone] Your Primary Care Physician (PCP) Primary Care Physician Office Phone Office Fax NONE ** None ** ** None ** Follow-up Information Follow up With Details Comments Contact Info None   None ([phone]) Patient stated that they have no PCP Your Appointments Thursday September 21, 2017  1:00 PM EDT HOSPITAL FOLLOW-UP with Sunita Bird NP [facility] [address] [facility]  
[phone] Current Discharge Medication List  
  
START taking these medications Dose & Instructions Dispensing Information Comments Morning Noon Evening Bedtime  
 flecainide 50 mg tablet Commonly known as:  TAMBOCOR Your last dose was: Your next dose is:    
   
   
 Dose:  50 mg Take 1 Tab by mouth every twelve (12) hours for 30 days. Quantity:  60 Tab Refills:  2 metoprolol succinate 25 mg XL tablet Commonly known as:  TOPROL-XL Your last dose was: Your next dose is:    
   
   
 Dose:  12.5 mg Take 0.5 Tabs by mouth daily for 30 days. Quantity:  15 Tab Refills:  2 CONTINUE these medications which have NOT CHANGED Dose & Instructions Dispensing Information Comments Morning Noon Evening Bedtime  
 chlorhexidine 0.12 % solution Commonly known as:  PERIDEX Your last dose was: Your next dose is:    
   
   
 Dose:  15 mL  
15 mL by Swish and Spit route two (2) times a day. Quantity:  420 mL Refills:  0 HYDROcodone-acetaminophen 5-325 mg per tablet Commonly known as:  Hurman Grist Your last dose was: Your next dose is:    
   
   
 Dose:  1 Tab Take 1 Tab by mouth every four (4) hours as needed for Pain. Max Daily Amount: 6 Tabs. Quantity:  20 Tab Refills:  0 Where to Get Your Medications Information on where to get these meds will be given to you by the nurse or doctor. ! Ask your nurse or doctor about these medications  
  flecainide 50 mg tablet  
 metoprolol succinate 25 mg XL tablet Discharge Instructions [address] [place] [id number] ABLATION DISCHARGE INSTRUCTIONS Patient ID: 
Barry Vazquez [id number] 
65 y.o. 
1990 Admit Date: 9/11/2017 Discharge Date: 9/11/2017 Admitting Physician: Edna Noel MD  
 
Discharge Physician: Edna Noel MD 
 
Admission Diagnoses: SVT 
SVT (supraventricular tachycardia) ([facility]) Discharge Diagnoses: Active Problems: 
  SVT (supraventricular tachycardia) ([address] (9/11/2017) Discharge Condition: Good Cardiology Procedures this Admission:  AVRT ablation Disposition: home Reference discharge instructions provided by nursing for diet and activity. Follow-up with Dr Radha Munoz in one week. Call [phone] to make an appointment. Signed: 
Reji Li MD 
9/11/2017 
2:20 PM 
 
S/P ABLATION DISCHARGE INSTRUCTIONS It is normal to feel tired the first couple days. Take it easy and follow the physicians instructions. CHECK THE CATHETER INSERTION SITE DAILY: 
You may shower 24 hours after the procedure, remove the bandage during showering. Wash with soap and water and pat dry. Gentle cleaning of the site with soap and water is sufficient, cover with a dry clean dressing or bandage. Do not apply creams or powders to the area. Do not sit in a bathtub or pool of water for 7 days or until wound has completely healed. Temporary bruising and discomfort is normal and may last a few weeks. You may have a  formation of a small lump at the site which may last up to 6 weeks. CALL THE PHYSICIAN: If the site becomes red, swollen or feels warm to the touch If there is bleeding or drainage or if there is unusual pain at the groin or down the leg. If there is any bleeding, lie down, apply pressure or have someone apply pressure with a clean cloth until the bleeding stops. If the bleeding continues, call 911 to be transported to the hospital. 
DO  [facility] 727. Activity: For the first 24-48 hours or as instructed by the physician: No lifting, pushing or pulling over 5 pounds and no straining the insertion site. Do not life grocery bags or the garbage can, do not run the vacuum  or  for 7 days. Start with short walks as in the hospital and gradually increase as tolerated each day. It is recommended to walk 30 minutes 5-7 days per week. Follow your physicians instructions on activity. Avoid walking outside in extremes of heat or cold. Walk inside when it is cold and windy or hot and humid. Things to keep in mind: No driving for at least 5 days, or as designated by your physician. Limit the number of times you go up and down the stairs Take rests and pace yourself with activity. Be careful and do not strain with bowel movements. Medications: Take all medications as prescribed Call your physician if you have any questions Keep an updated list of your medications with you at all times and give a list to your physician and pharmacist 
 
Signs and Symptoms: 
Be cautious of symptoms of angina or recurrent symptoms such as chest discomfort, unusual shortness of breath or fatigue, palpitations. After Care: Follow up with your physician as instructed. Follow a heart healthy diet with proper portion control, daily stress management, daily exercise, blood pressure and cholesterol control , and smoking cessation. Discharge Orders None Introducing [address]! Reg Nunes introduces [employer] patient portal. Now you can access parts of your medical record, email your doctor's office, and request medication refills online. 1. In your internet browser, go to [email or web address] 2. Click on the First Time User? Click Here link in the Sign In box. You will see the New Member Sign Up page. 3. Enter your [employer] Access Code exactly as it appears below. You will not need to use this code after youve completed the sign-up process. If you do not sign up before the expiration date, you must request a new code. · [employer] Access Code: [email or web address] Expires: 11/29/2017  8:30 AM 
 
4. Enter the last four digits of your Social Security Number (xxxx) and Date of Birth (mm/dd/yyyy) as indicated and click Submit. You will be taken to the next sign-up page. 5. Create a [employer] ID. This will be your [employer] login ID and cannot be changed, so think of one that is secure and easy to remember. 6. Create a [employer] password. You can change your password at any time. 7. Enter your Password Reset Question and Answer. This can be used at a later time if you forget your password. 8. Enter your e-mail address. You will receive e-mail notification when new information is available in [address] 9. Click Sign Up. You can now view and download portions of your medical record. 10. Click the Download Summary menu link to download a portable copy of your medical information. If you have questions, please visit the Frequently Asked Questions section of the [employer] website. Remember, [employer] is NOT to be used for urgent needs. For medical emergencies, dial 911. Now available from your iPhone and Android! General Information Please provide this summary of care documentation to your next provider. Patient Signature:  ____________________________________________________________ Date:  ____________________________________________________________  
  
Citlali Kiran Provider Signature:  ____________________________________________________________ Date:  ____________________________________________________________

## 2017-09-11 NOTE — ANESTHESIA POSTPROCEDURE EVALUATION
Post-Anesthesia Evaluation and Assessment    Patient: Heike Grant MRN: [id number]  SSN: [id number]    YOB: 1990  Age: 23 y.o. Sex: male       Cardiovascular Function/Vital Signs  Visit Vitals    /84    Pulse 74    Temp 36.4 °C (97.6 °F)    Resp 16    Ht 6' 1\" (1.854 m)    Wt 108.9 kg (240 lb)    SpO2 100%    BMI 31.66 kg/m2       Patient is status post general, total IV anesthesia anesthesia for * No procedures listed *. Nausea/Vomiting: None    Postoperative hydration reviewed and adequate. Pain:  Pain Scale 1: Numeric (0 - 10) (09/11/17 0825)  Pain Intensity 1: 0 (09/11/17 0825)   Managed    Neurological Status: At baseline    Mental Status and Level of Consciousness: Arousable    Pulmonary Status:   O2 Device: Nasal cannula (09/11/17 1412)   Adequate oxygenation and airway patent    Complications related to anesthesia: None    Post-anesthesia assessment completed.  No concerns    Signed By: Kel Wilkerson MD     September 11, 2017

## 2017-09-11 NOTE — H&P (VIEW-ONLY)
Subjective:      Linda Phipps is a 23 y.o. male is here for EP consult. He has a hx of WPW with ablation in 2014. He has been doing generally well until recently. He has been having episodes of dizziness with rapid heart beat. He feels palpitations that feel like a racing beat, going really fast. He has felt dizzy, held his breath and it seemed to slow things down. He gets a weird pressure in his chest at times. He has sob associated with this pressure. The patient denies orthopnea, PND, LE edema, syncope, presyncope or fatigue. Patient Active Problem List    Diagnosis Date Noted    SOB (shortness of breath) 08/31/2017    Paroxysmal supraventricular tachycardia ([address] 07/02/2012    Other dyspnea and respiratory abnormality 07/02/2012    WPW (Hunter-Parkinson-White syndrome) 05/31/2012    Angina at rest [facility]) 05/31/2012      None  Past Medical History:   Diagnosis Date    Angina at rest [facility])     WPW (Hunter-Parkinson-White syndrome)       Past Surgical History:   Procedure Laterality Date    CARDIAC SURG PROCEDURE UNLIST  2014    Cardiac ablation ([facility] WPW)     No Known Allergies   Family History   Problem Relation Age of Onset    Arrhythmia Father     negative for cardiac disease  Social History     Social History    Marital status: SINGLE     Spouse name: N/A    Number of children: N/A    Years of education: N/A     Social History Main Topics    Smoking status: Never Smoker    Smokeless tobacco: Never Used      Comment: 1 CIGAR DAILY    Alcohol use Yes      Comment: occasional    Drug use: Yes     Special: Marijuana      Comment: stated he smokes everyday, twice a day    Sexual activity: Yes     Partners: Female     Birth control/ protection: Condom     Other Topics Concern    None     Social History Narrative     Current Outpatient Prescriptions   Medication Sig    HYDROcodone-acetaminophen (NORCO) 5-325 mg per tablet Take 1 Tab by mouth every four (4) hours as needed for Pain.  Max Daily Amount: 6 Tabs.  chlorhexidine (PERIDEX) 0.12 % solution 15 mL by Swish and Spit route two (2) times a day. No current facility-administered medications for this visit. Vitals:    08/31/17 1007 08/31/17 1019 08/31/17 1020   BP: 104/60 110/76 114/80   Pulse: 60     Resp: 18     SpO2: 97%     Weight: 239 lb 6.4 oz (108.6 kg)     Height: 6' (1.829 m)         I have reviewed the nurses notes, vitals, problem list, allergy list, medical history, family, social history and medications. Review of Symptoms:    General: Pt denies excessive weight gain or loss. Pt is able to conduct ADL's  HEENT: Denies blurred vision, headaches, epistaxis and difficulty swallowing. Respiratory: + shortness of breath, denies CHRISTIANSON, wheezing or stridor. Cardiovascular: +chest pressure, Denies precordial pain, +palpitations, denies edema or PND  Gastrointestinal: Denies poor appetite, indigestion, abdominal pain or blood in stool  Urinary: Denies dysuria, pyuria  Musculoskeletal: Denies pain or swelling from muscles or joints  Neurologic: Denies tremor, paresthesias, +dizziness  Skin: Denies rash, itching or texture change. Psych: Denies depression      Physical Exam:      General: Well developed, in no acute distress. HEENT: Eyes - PERRL, no jvd  Heart:  Normal S1/S2 negative S3 or S4. Regular, no murmur, gallop or rub.   Respiratory: Clear bilaterally x 4, no wheezing or rales  Abdomen:   Soft, non-tender, bowel sounds are active.   Extremities:  No edema, normal cap refill, no cyanosis. Musculoskeletal: No clubbing  Neuro: A&Ox3, speech clear, gait stable. Skin: Skin color is normal. No rashes or lesions.  Non diaphoretic  Vascular: 2+ pulses symmetric in all extremities    Cardiographics    Ekg: Sinus  Rhythm HR 65  wpw pattern    Results for orders placed or performed during the hospital encounter of 08/20/14   EKG, 12 LEAD, INITIAL   Result Value Ref Range    Ventricular Rate 75 BPM    Atrial Rate 75 BPM    P-R Interval 122 ms    QRS Duration 88 ms    Q-T Interval 388 ms    QTC Calculation (Bezet) 433 ms    Calculated P Axis 67 degrees    Calculated R Axis 75 degrees    Calculated T Axis 35 degrees    Diagnosis       Normal sinus rhythm  Normal ECG  When compared with ECG of 25-JUN-2014 07:21,  QRS duration has decreased  Confirmed by LOGAN Ortiz ([id number]) on 8/21/2014 12:37:49 PM         Lab Results   Component Value Date/Time    WBC 8.1 08/20/2014 09:48 PM    HGB 12.7 08/20/2014 09:48 PM    HCT 36.9 08/20/2014 09:48 PM    PLATELET 304 26/19/5248 09:48 PM    MCV 83.3 08/20/2014 09:48 PM      Lab Results   Component Value Date/Time    Sodium 140 08/20/2014 09:48 PM    Potassium 3.9 08/20/2014 09:48 PM    Chloride 107 08/20/2014 09:48 PM    CO2 28 08/20/2014 09:48 PM    Anion gap 5 08/20/2014 09:48 PM    Glucose 91 08/20/2014 09:48 PM    BUN 9 08/20/2014 09:48 PM    Creatinine 0.99 08/20/2014 09:48 PM    BUN/Creatinine ratio 9 08/20/2014 09:48 PM    GFR est AA >60 08/20/2014 09:48 PM    GFR est non-AA >60 08/20/2014 09:48 PM    Calcium 8.6 08/20/2014 09:48 PM    Bilirubin, total 0.6 08/20/2014 09:48 PM    AST (SGOT) 24 08/20/2014 09:48 PM    Alk. phosphatase 72 08/20/2014 09:48 PM    Protein, total 7.1 08/20/2014 09:48 PM    Albumin 4.0 08/20/2014 09:48 PM    Globulin 3.1 08/20/2014 09:48 PM    A-G Ratio 1.3 08/20/2014 09:48 PM    ALT (SGPT) 37 08/20/2014 09:48 PM         Assessment:     Assessment:        ICD-10-CM ICD-9-CM    1. WPW (Hunter-Parkinson-White syndrome) I45.6 426.7 AMB POC EKG ROUTINE W/ 12 LEADS, INTER & REP      CBC WITH AUTOMATED DIFF      METABOLIC PANEL, COMPREHENSIVE      PROTHROMBIN TIME + INR   2. SOB (shortness of breath) R06.02 786.05 AMB POC EKG ROUTINE W/ 12 LEADS, INTER & REP      CBC WITH AUTOMATED DIFF      METABOLIC PANEL, COMPREHENSIVE      PROTHROMBIN TIME + INR   3.  Palpitations R00.2 785.1 AMB POC EKG ROUTINE W/ 12 LEADS, INTER & REP      CBC WITH AUTOMATED DIFF      METABOLIC PANEL, COMPREHENSIVE      PROTHROMBIN TIME + INR     Orders Placed This Encounter    CBC WITH AUTOMATED DIFF    METABOLIC PANEL, COMPREHENSIVE    PROTHROMBIN TIME + INR    AMB POC EKG ROUTINE W/ 12 LEADS, INTER & REP     Order Specific Question:   Reason for Exam:     Answer:   routine        Plan:   Mr Shira Aldana is here today for consultation regarding rapid palpitations associated with dizziness, sob and chest pressure. He has a hx of WPW with successful ablation in 2014. His ekg today is showing a WPW pattern. He is a candidate for a redo ablation. I discussed the risks/benefits/alternatives of the procedure with the patient. Risks include (but are not limited to) bleeding, infection, cva/mi/tamponade/death. The patient understands and agrees to proceed. Thank you for this interesting consultation. Thank you for allowing me to participate in Lien Juan C 's care.     Mehdi Young MD, Edita Brown

## 2017-09-11 NOTE — INTERVAL H&P NOTE
H&P Update:  Earline Yadav was seen and examined. History and physical has been reviewed. The patient has been examined.  There have been no significant clinical changes since the completion of the originally dated History and Physical.    Signed By: Matthew Arredondo MD     September 11, 2017 8:10 AM

## 2017-09-11 NOTE — ANESTHESIA PREPROCEDURE EVALUATION
Anesthetic History   No history of anesthetic complications            Review of Systems / Medical History  Patient summary reviewed, nursing notes reviewed and pertinent labs reviewed    Pulmonary  Within defined limits                 Neuro/Psych   Within defined limits           Cardiovascular  Within defined limits          Dysrhythmias       Exercise tolerance: >4 METS  Comments: Hx WPW   GI/Hepatic/Renal  Within defined limits              Endo/Other  Within defined limits           Other Findings              Physical Exam    Airway  Mallampati: II  TM Distance: 4 - 6 cm  Neck ROM: normal range of motion   Mouth opening: Normal     Cardiovascular  Regular rate and rhythm,  S1 and S2 normal,  no murmur, click, rub, or gallop             Dental  No notable dental hx       Pulmonary  Breath sounds clear to auscultation               Abdominal  GI exam deferred       Other Findings            Anesthetic Plan    ASA: 2  Anesthesia type: general and total IV anesthesia          Induction: Intravenous  Anesthetic plan and risks discussed with: Patient

## 2017-09-11 NOTE — IP AVS SNAPSHOT
[address] [facility] 
[phone] Patient: Shaun Mera MRN: [id number] UOH:45/08/2705 Current Discharge Medication List  
  
START taking these medications Dose & Instructions Dispensing Information Comments Morning Noon Evening Bedtime  
 flecainide 50 mg tablet Commonly known as:  TAMBOCOR Your last dose was: Your next dose is:    
   
   
 Dose:  50 mg Take 1 Tab by mouth every twelve (12) hours for 30 days. Quantity:  60 Tab Refills:  2  
     
   
   
   
  
 metoprolol succinate 25 mg XL tablet Commonly known as:  TOPROL-XL Your last dose was: Your next dose is:    
   
   
 Dose:  12.5 mg Take 0.5 Tabs by mouth daily for 30 days. Quantity:  15 Tab Refills:  2 CONTINUE these medications which have NOT CHANGED Dose & Instructions Dispensing Information Comments Morning Noon Evening Bedtime  
 chlorhexidine 0.12 % solution Commonly known as:  PERIDEX Your last dose was: Your next dose is:    
   
   
 Dose:  15 mL  
15 mL by Swish and Spit route two (2) times a day. Quantity:  420 mL Refills:  0 HYDROcodone-acetaminophen 5-325 mg per tablet Commonly known as:  John Housere Your last dose was: Your next dose is:    
   
   
 Dose:  1 Tab Take 1 Tab by mouth every four (4) hours as needed for Pain. Max Daily Amount: 6 Tabs. Quantity:  20 Tab Refills:  0 Where to Get Your Medications Information on where to get these meds will be given to you by the nurse or doctor. ! Ask your nurse or doctor about these medications  
  flecainide 50 mg tablet  
 metoprolol succinate 25 mg XL tablet

## 2017-09-12 VITALS
SYSTOLIC BLOOD PRESSURE: 120 MMHG | BODY MASS INDEX: 31.81 KG/M2 | OXYGEN SATURATION: 100 % | HEIGHT: 73 IN | DIASTOLIC BLOOD PRESSURE: 71 MMHG | HEART RATE: 90 BPM | TEMPERATURE: 98.6 F | RESPIRATION RATE: 14 BRPM | WEIGHT: 240 LBS

## 2017-09-12 LAB
ATRIAL RATE: 68 BPM
CALCULATED P AXIS, ECG09: 68 DEGREES
CALCULATED R AXIS, ECG10: 64 DEGREES
CALCULATED T AXIS, ECG11: 15 DEGREES
DIAGNOSIS, 93000: NORMAL
P-R INTERVAL, ECG05: 194 MS
Q-T INTERVAL, ECG07: 396 MS
QRS DURATION, ECG06: 84 MS
QTC CALCULATION (BEZET), ECG08: 421 MS
VENTRICULAR RATE, ECG03: 68 BPM

## 2017-09-12 PROCEDURE — 74011250637 HC RX REV CODE- 250/637: Performed by: INTERNAL MEDICINE

## 2017-09-12 PROCEDURE — 93005 ELECTROCARDIOGRAM TRACING: CPT

## 2017-09-12 PROCEDURE — 99218 HC RM OBSERVATION: CPT

## 2017-09-12 RX ADMIN — ACETAMINOPHEN 650 MG: 325 TABLET ORAL at 01:05

## 2017-09-12 RX ADMIN — Medication 10 ML: at 06:52

## 2017-09-12 RX ADMIN — FLECAINIDE ACETATE 50 MG: 100 TABLET ORAL at 08:01

## 2017-09-12 RX ADMIN — METOPROLOL SUCCINATE 12.5 MG: 25 TABLET, EXTENDED RELEASE ORAL at 08:01

## 2017-09-12 RX ADMIN — ACETAMINOPHEN 650 MG: 325 TABLET ORAL at 06:59

## 2017-09-12 NOTE — PROGRESS NOTES
Problem: Falls - Risk of  Goal: *Absence of Falls  Document Carisa Fall Risk and appropriate interventions in the flowsheet.    Outcome: Progressing Towards Goal  Fall Risk Interventions:              Medication Interventions: Patient to call before getting OOB

## 2017-09-12 NOTE — CARDIO/PULMONARY
C/P Rehab Note:    Chart Reviewed. Pt has a hx of WPW with ablation in 2014, S/p Ablation 9/11/17. Echo on, ( 6/14) 60-65%. Pt is a non smoker. History significant for:  -Angina    Met with pt who was lying in bed. Provided pt a handout on \" EP STUDY, AFTER YOUR PROCEDURE\". Reviewed activity restrictions: No tub baths or swimming for the first seven days. No driving for the first 24 hours or strenuous activities. Discussed signs and symptoms of infection and when to call the MD. The importance of taking medications as prescribed and keeping follow up appointments. Pt without questions and demonsntrated understanding.

## 2017-09-12 NOTE — PROGRESS NOTES
Cardiology Progress Note            [address]  [phone]    9/12/2017 12:20 PM    Admit Date: 9/11/2017    Admit Diagnosis: SVT;SVT (supraventricular tachycardia) ([address]    Subjective:     Justyna Session   denies chest pain. Visit Vitals    /71    Pulse 90    Temp 98.6 °F (37 °C)    Resp 14    Ht 6' 1\" (1.854 m)    Wt 240 lb (108.9 kg)    SpO2 100%    BMI 31.66 kg/m2     Current Facility-Administered Medications   Medication Dose Route Frequency    sodium chloride (NS) flush 5-10 mL  5-10 mL IntraVENous Q8H    sodium chloride (NS) flush 5-10 mL  5-10 mL IntraVENous PRN    acetaminophen (TYLENOL) tablet 650 mg  650 mg Oral Q4H PRN    HYDROcodone-acetaminophen (NORCO) 5-325 mg per tablet 1 Tab  1 Tab Oral Q4H PRN    metoprolol succinate (TOPROL-XL) XL tablet 12.5 mg  12.5 mg Oral DAILY    flecainide (TAMBOCOR) tablet 50 mg  50 mg Oral Q12H     Current Outpatient Prescriptions   Medication Sig    flecainide (TAMBOCOR) 50 mg tablet Take 1 Tab by mouth every twelve (12) hours for 30 days.  metoprolol succinate (TOPROL-XL) 25 mg XL tablet Take 0.5 Tabs by mouth daily for 30 days.  HYDROcodone-acetaminophen (NORCO) 5-325 mg per tablet Take 1 Tab by mouth every four (4) hours as needed for Pain. Max Daily Amount: 6 Tabs.  chlorhexidine (PERIDEX) 0.12 % solution 15 mL by Swish and Spit route two (2) times a day. Objective:      Visit Vitals    /71    Pulse 90    Temp 98.6 °F (37 °C)    Resp 14    Ht 6' 1\" (1.854 m)    Wt 240 lb (108.9 kg)    SpO2 100%    BMI 31.66 kg/m2       Physical Exam:  Abdomen: soft, non-tender  Extremities: extremities normal  Heart: regular rate and rhythm  Lungs: clear to auscultation bilaterally  Pulses: 2+ and symmetric    Data Review:   Labs:    No results for input(s): WBC, HGB, HCT, PLT, HGBEXT, HCTEXT, PLTEXT in the last 72 hours.   No results for input(s): NA, K, CL, CO2, GLU, BUN, CREA, CA, MG, PHOS, ALB, TBIL, TBILI, SGOT, ALT, INR in the last 72 hours. No lab exists for component:  BNP, INREXT    No results for input(s): TROIQ, CPK, CKMB in the last 72 hours. 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 He is in sinus. Cont med rx. [id number] Esper[address] Cooley for [employer].  F/u in 1 week    Nazia Delaney MD, [facility]    9/12/2017

## 2017-09-12 NOTE — PROGRESS NOTES
Bedside shift change report given to Beau Rizo (oncoming nurse) by Ramone Escalante RN (offgoing nurse). Report included the following information SBAR, Kardex, Intake/Output, MAR and Recent Results. Discussed discharge instructions with patient and family members, no further questions regarding.

## 2017-09-13 ENCOUNTER — HOSPITAL ENCOUNTER (EMERGENCY)
Age: 27
Discharge: HOME | End: 2017-09-13
Attending: EMERGENCY MEDICINE
Payer: SELF-PAY

## 2017-09-13 ENCOUNTER — APPOINTMENT (OUTPATIENT)
Dept: GENERAL RADIOLOGY | Age: 27
End: 2017-09-13
Attending: EMERGENCY MEDICINE
Payer: SELF-PAY

## 2017-09-13 VITALS
OXYGEN SATURATION: 100 % | HEIGHT: 73 IN | DIASTOLIC BLOOD PRESSURE: 84 MMHG | SYSTOLIC BLOOD PRESSURE: 110 MMHG | BODY MASS INDEX: 30.74 KG/M2 | RESPIRATION RATE: 15 BRPM | HEART RATE: 104 BPM | WEIGHT: 231.92 LBS | TEMPERATURE: 98.3 F

## 2017-09-13 DIAGNOSIS — I47.1 NARROW COMPLEX TACHYCARDIA (HCC): Primary | ICD-10-CM

## 2017-09-13 DIAGNOSIS — I44.0 FIRST DEGREE HEART BLOCK: ICD-10-CM

## 2017-09-13 DIAGNOSIS — Z98.890 STATUS POST ABLATION OF ACCESSORY BYPASS TRACT: ICD-10-CM

## 2017-09-13 DIAGNOSIS — E86.0 DEHYDRATION: ICD-10-CM

## 2017-09-13 LAB
ALBUMIN SERPL-MCNC: 3.8 G/DL (ref 3.5–5)
ALBUMIN/GLOB SERPL: 1 {RATIO} (ref 1.1–2.2)
ALP SERPL-CCNC: 68 U/L (ref 45–117)
ALT SERPL-CCNC: 34 U/L (ref 12–78)
ANION GAP SERPL CALC-SCNC: 8 MMOL/L (ref 5–15)
AST SERPL-CCNC: 32 U/L (ref 15–37)
ATRIAL RATE: 104 BPM
ATRIAL RATE: 90 BPM
BASOPHILS # BLD: 0 K/UL (ref 0–0.1)
BASOPHILS NFR BLD: 0 % (ref 0–1)
BILIRUB SERPL-MCNC: 1.4 MG/DL (ref 0.2–1)
BUN SERPL-MCNC: 6 MG/DL (ref 6–20)
BUN/CREAT SERPL: 6 (ref 12–20)
CALCIUM SERPL-MCNC: 8.6 MG/DL (ref 8.5–10.1)
CALCULATED P AXIS, ECG09: 26 DEGREES
CALCULATED P AXIS, ECG09: 49 DEGREES
CALCULATED R AXIS, ECG10: 62 DEGREES
CALCULATED R AXIS, ECG10: 73 DEGREES
CALCULATED T AXIS, ECG11: 21 DEGREES
CALCULATED T AXIS, ECG11: 9 DEGREES
CHLORIDE SERPL-SCNC: 107 MMOL/L (ref 97–108)
CO2 SERPL-SCNC: 25 MMOL/L (ref 21–32)
CREAT SERPL-MCNC: 1.04 MG/DL (ref 0.7–1.3)
DIAGNOSIS, 93000: NORMAL
DIAGNOSIS, 93000: NORMAL
EOSINOPHIL # BLD: 0.1 K/UL (ref 0–0.4)
EOSINOPHIL NFR BLD: 1 % (ref 0–7)
ERYTHROCYTE [DISTWIDTH] IN BLOOD BY AUTOMATED COUNT: 12.1 % (ref 11.5–14.5)
GLOBULIN SER CALC-MCNC: 3.9 G/DL (ref 2–4)
GLUCOSE SERPL-MCNC: 90 MG/DL (ref 65–100)
HCT VFR BLD AUTO: 39.9 % (ref 36.6–50.3)
HGB BLD-MCNC: 13.9 G/DL (ref 12.1–17)
LYMPHOCYTES # BLD: 2.7 K/UL (ref 0.8–3.5)
LYMPHOCYTES NFR BLD: 27 % (ref 12–49)
MCH RBC QN AUTO: 28.6 PG (ref 26–34)
MCHC RBC AUTO-ENTMCNC: 34.8 G/DL (ref 30–36.5)
MCV RBC AUTO: 82.1 FL (ref 80–99)
MONOCYTES # BLD: 1.2 K/UL (ref 0–1)
MONOCYTES NFR BLD: 11 % (ref 5–13)
NEUTS SEG # BLD: 6.3 K/UL (ref 1.8–8)
NEUTS SEG NFR BLD: 61 % (ref 32–75)
P-R INTERVAL, ECG05: 324 MS
P-R INTERVAL, ECG05: 424 MS
PLATELET # BLD AUTO: 272 K/UL (ref 150–400)
POTASSIUM SERPL-SCNC: 3.7 MMOL/L (ref 3.5–5.1)
PROT SERPL-MCNC: 7.7 G/DL (ref 6.4–8.2)
Q-T INTERVAL, ECG07: 344 MS
Q-T INTERVAL, ECG07: 374 MS
QRS DURATION, ECG06: 84 MS
QRS DURATION, ECG06: 86 MS
QTC CALCULATION (BEZET), ECG08: 452 MS
QTC CALCULATION (BEZET), ECG08: 457 MS
RBC # BLD AUTO: 4.86 M/UL (ref 4.1–5.7)
SODIUM SERPL-SCNC: 140 MMOL/L (ref 136–145)
TROPONIN I BLD-MCNC: 1.78 NG/ML (ref 0–0.08)
TROPONIN I SERPL-MCNC: 3.45 NG/ML
VENTRICULAR RATE, ECG03: 104 BPM
VENTRICULAR RATE, ECG03: 90 BPM
WBC # BLD AUTO: 10.3 K/UL (ref 4.1–11.1)

## 2017-09-13 PROCEDURE — 99285 EMERGENCY DEPT VISIT HI MDM: CPT

## 2017-09-13 PROCEDURE — 71010 XR CHEST PORT: CPT

## 2017-09-13 PROCEDURE — 36415 COLL VENOUS BLD VENIPUNCTURE: CPT | Performed by: EMERGENCY MEDICINE

## 2017-09-13 PROCEDURE — 80053 COMPREHEN METABOLIC PANEL: CPT | Performed by: EMERGENCY MEDICINE

## 2017-09-13 PROCEDURE — 93005 ELECTROCARDIOGRAM TRACING: CPT

## 2017-09-13 PROCEDURE — 84484 ASSAY OF TROPONIN QUANT: CPT | Performed by: EMERGENCY MEDICINE

## 2017-09-13 PROCEDURE — 85025 COMPLETE CBC W/AUTO DIFF WBC: CPT | Performed by: EMERGENCY MEDICINE

## 2017-09-13 PROCEDURE — 96360 HYDRATION IV INFUSION INIT: CPT

## 2017-09-13 PROCEDURE — 74011250636 HC RX REV CODE- 250/636: Performed by: EMERGENCY MEDICINE

## 2017-09-13 RX ADMIN — SODIUM CHLORIDE 1000 ML: 900 INJECTION, SOLUTION INTRAVENOUS at 13:17

## 2017-09-13 NOTE — DISCHARGE INSTRUCTIONS
Learning About Catheter Ablation for Heart Rhythm Problems  What is catheter ablation? Catheter ablation is a procedure that treats heart rhythm problems. These problems include atrial fibrillation, supraventricular tachycardia (SVT), atrial flutter, and ventricular tachycardia. Your heart should have a strong, steady beat. That beat is controlled by the heart's electrical system. Sometimes that system misfires. This causes a heartbeat that is too fast and isn't steady. Catheter ablation is a way to get into your heart and fix the problem. Ablation is not surgery. How is catheter ablation done? Your doctor inserts thin tubes called catheters into a blood vessel in your groin, arm, or neck. Then your doctor feeds them into the heart. Wires in the catheters help the doctor find the problem areas. Then he or she uses the wires to send energy to destroy the tiny areas of heart tissue that are causing the problems. It may seem like a bad idea to destroy parts of your heart on purpose. But the areas that are destroyed are very tiny. They should not affect your heart's ability to do its job. You may be awake during the procedure. Or you may be asleep. The doctor will give you medicines to help you feel relaxed and to numb the areas where the catheters go in. You may feel a little uncomfortable, but you should not feel pain. This procedure usually takes 2 to 6 hours. In rare cases, it can take longer. You may stay overnight in the hospital. How long you stay in the hospital depends on the type of ablation you have. What can you expect after catheter ablation? Do not exercise hard or lift anything heavy for a week. You will probably be able to go back to work and to your normal routine in 1 or 2 days. You may have swelling, bruising, or a small lump around the site where the catheters went into your body. These should go away in 3 to 4 weeks. You may have to take some medicines for a while.   Follow-up care is a key part of your treatment and safety. Be sure to make and go to all appointments, and call your doctor if you are having problems. It's also a good idea to know your test results and keep a list of the medicines you take. Where can you learn more? Go to http://pat-leo.info/. Enter K284 in the search box to learn more about \"Learning About Catheter Ablation for Heart Rhythm Problems. \"  Current as of: July 28, 2016  Content Version: 11.3  © 0207-1639 [employer]. Care instructions adapted under license by [employer] (which disclaims liability or warranty for this information). If you have questions about a medical condition or this instruction, always ask your healthcare professional. [address] any warranty or liability for your use of this information. Dehydration: Care Instructions  Your Care Instructions  Dehydration happens when your body loses too much fluid. This might happen when you do not drink enough water or you lose large amounts of fluids from your body because of diarrhea, vomiting, or sweating. Severe dehydration can be life-threatening. Water and minerals called electrolytes help put your body fluids back in balance. Learn the early signs of fluid loss, and drink more fluids to prevent dehydration. Follow-up care is a key part of your treatment and safety. Be sure to make and go to all appointments, and call your doctor if you are having problems. It's also a good idea to know your test results and keep a list of the medicines you take. How can you care for yourself at home? · To prevent dehydration, drink plenty of fluids, enough so that your urine is light yellow or clear like water. Choose water and other caffeine-free clear liquids until you feel better. If you have kidney, heart, or liver disease and have to limit fluids, talk with your doctor before you increase the amount of fluids you drink.   · If you do not feel like eating or drinking, try taking small sips of water, sports drinks, or other rehydration drinks. · Get plenty of rest.  To prevent dehydration  · Add more fluids to your diet and daily routine, unless your doctor has told you not to. · During hot weather, drink more fluids. Drink even more fluids if you exercise a lot. Stay away from drinks with alcohol or caffeine. · Watch for the symptoms of dehydration. These include:  ¨ A dry, sticky mouth. ¨ Dark yellow urine, and not much of it. ¨ Dry and sunken eyes. ¨ Feeling very tired. · Learn what problems can lead to dehydration. These include:  ¨ Diarrhea, fever, and vomiting. ¨ Any illness with a fever, such as pneumonia or the flu. ¨ Activities that cause heavy sweating, such as endurance races and heavy outdoor work in hot or humid weather. ¨ Alcohol or drug abuse or withdrawal.  ¨ Certain medicines, such as cold and allergy pills (antihistamines), diet pills (diuretics), and laxatives. ¨ Certain diseases, such as diabetes, cancer, and heart or kidney disease. When should you call for help? Call 911 anytime you think you may need emergency care. For example, call if:  · You passed out (lost consciousness). Call your doctor now or seek immediate medical care if:  · You are confused and cannot think clearly. · You are dizzy or lightheaded, or you feel like you may faint. · You have signs of needing more fluids. You have sunken eyes and a dry mouth, and you pass only a little dark urine. · You cannot keep fluids down. Watch closely for changes in your health, and be sure to contact your doctor if:  · You are not making tears. · Your skin is very dry and sags slowly back into place after you pinch it. · Your mouth and eyes are very dry. Where can you learn more? Go to http://pat-leo.info/. Enter A189 in the search box to learn more about \"Dehydration: Care Instructions. \"  Current as of: March 20, 2017  Content Version: 11.3  © 0623-1489 [employer]. Care instructions adapted under license by [employer] (which disclaims liability or warranty for this information). If you have questions about a medical condition or this instruction, always ask your healthcare professional. [address] any warranty or liability for your use of this information.

## 2017-09-13 NOTE — ED PROVIDER NOTES
HPI Comments: Juan J Burdick is a 23 y.o. male with PMhx significant for WPW who presents ambulatory to the [facility] ED with cc of palpitations since last night. Pt reports he had an ablation performed by Dr. Justin Andrews 2 days ago and was discharged yesterday. He states he called Dr. Justin Andrews earlier today but was told to go to the ED because Dr. Justin Andrews was in surgery and therefore could not speak wit him. Pt states he felt palpitations when standing up from a seated position while at home, but was told before discharge that this would be normal. However, he reports that last night the palpitations continued without ceasing, and they woke him up, so he took Metoprolol and Flecainide at ~3:00 AM. Pt reports he feels the continued palpitations in the room. He states he is experiencing mild SOB. Pt specifically denies CP and pain with deep breaths. Social Hx: + Tobacco (1 cigar daily), + EtOH (occasional), + Illicit Drugs (marijuana, daily)    PCP: None  Cardiology: Dr. Justin Andrews    There are no other complaints, changes or physical findings at this time. The history is provided by the patient. No  was used. Past Medical History:   Diagnosis Date    [facility] rest [facility])     WPW (Hunter-Parkinson-White syndrome)        Past Surgical History:   Procedure Laterality Date    CARDIAC SURG PROCEDURE UNLIST  2014    Cardiac ablation ([facility] WPW)         Family History:   Problem Relation Age of Onset    Arrhythmia Father        Social History     Social History    Marital status: SINGLE     Spouse name: N/A    Number of children: N/A    Years of education: N/A     Occupational History    Not on file.      Social History Main Topics    Smoking status: Never Smoker    Smokeless tobacco: Never Used      Comment: 1 CIGAR DAILY    Alcohol use Yes      Comment: occasional    Drug use: Yes     Special: Marijuana      Comment: stated he smokes everyday, twice a day    Sexual activity: Yes Partners: Female     Birth control/ protection: Condom     Other Topics Concern    Not on file     Social History Narrative         ALLERGIES: Review of patient's allergies indicates no known allergies. Review of Systems   Constitutional: Negative for chills and fever. Respiratory: Positive for shortness of breath (mild). Negative for cough. Cardiovascular: Positive for palpitations. Negative for chest pain. Gastrointestinal: Negative for constipation, diarrhea, nausea and vomiting. Neurological: Negative for weakness and numbness. All other systems reviewed and are negative. Patient Vitals for the past 12 hrs:   Temp Pulse Resp BP SpO2   09/13/17 1300 - (!) 104 15 110/84 100 %   09/13/17 1239 98.3 °F (36.8 °C) (!) 106 18 117/69 100 %            Physical Exam   Constitutional: He is oriented to person, place, and time. He appears well-developed and well-nourished. HENT:   Head: Normocephalic and atraumatic. Eyes: Conjunctivae and EOM are normal.   Neck: Normal range of motion. Neck supple. Cardiovascular: Regular rhythm. Tachycardia present. Pulmonary/Chest: Effort normal and breath sounds normal. No respiratory distress. Abdominal: Soft. He exhibits no distension. There is no tenderness. Musculoskeletal: Normal range of motion. Neurological: He is alert and oriented to person, place, and time. Skin: Skin is warm and dry. Psychiatric: He has a normal mood and affect. Nursing note and vitals reviewed. MDM  Number of Diagnoses or Management Options  Dehydration:   First degree heart block:   Narrow complex tachycardia ([address]):   Status post ablation of accessory bypass tract:   Diagnosis management comments:   Patient presents with palpitations. DDx: afib vs. Aflutter with RVR, sinus tachycardia, SVT, stable VTach. Will get labs, EKG, CXR and treat rhythm as indicated. Will obtain cardiology consult if warranted.           Amount and/or Complexity of Data Reviewed  Clinical lab tests: ordered and reviewed  Tests in the radiology section of CPT®: ordered and reviewed  Tests in the medicine section of CPT®: ordered and reviewed  Review and summarize past medical records: yes  Discuss the patient with other providers: yes (Cardiology)  Independent visualization of images, tracings, or specimens: yes    Patient Progress  Patient progress: stable    ED Course       Procedures    EKG interpretation: (Preliminary) 12:43 PM  Rhythm: sinus tachycardia with 1st degree AV block; and irregular. Rate (approx.): 104; Axis: normal; OK interval: prolonged; QRS interval: normal; 1.5 mm ORALIA in V3. No reciprocal changes. EKG interpretation: (Preliminary) 1:57 PM  Rhythm: sinus rhythm with 1st degree AV block; and regular . Rate (approx.): 90; Axis: normal; OK interval: prolonged; QRS interval: normal ; ST elevation, consider early repolarization, pericarditis, or injury. PROGRESS NOTE:  2:15 PM  Pt's heart rate improved to the 80s after 1L fluid. Pt states he feels much better. Will wait for cardiology consult and will likely go home. CONSULT NOTE:  2:32 PM  Yannick Parrish MD spoke with Dr. Chaya Schwartz,  Specialty: Cardiology  Discussed patient's hx, disposition, and available diagnostic and imaging results. Reviewed care plans. Consultant recommends cessation of metoprolol and flecainide because of pt's 1st degree heart block. He says the EKG might be pericarditis due ablation. Will see pt. PROGRESS NOTE:  2:38 PM  Chaya Cooley called to say pt is still going in and out of tachycardia. [place] [facility] [place]. Safe to go home.       LABORATORY TESTS:  Recent Results (from the past 12 hour(s))   EKG, 12 LEAD, INITIAL    Collection Time: 09/13/17 12:43 PM   Result Value Ref Range    Ventricular Rate 104 BPM    Atrial Rate 104 BPM    P-R Interval 424 ms    QRS Duration 84 ms    Q-T Interval 344 ms    QTC Calculation (Bezet) 452 ms    Calculated P Axis 26 degrees    Calculated R Axis 73 degrees    Calculated T Axis 21 degrees    Diagnosis       Sinus tachycardia with 1st degree AV block  Confirmed by Raina Franks ([id number]) on 9/13/2017 4:02:29 PM     CBC WITH AUTOMATED DIFF    Collection Time: 09/13/17  1:32 PM   Result Value Ref Range    WBC 10.3 4.1 - 11.1 K/uL    RBC 4.86 4.10 - 5.70 M/uL    HGB 13.9 12.1 - 17.0 g/dL    HCT 39.9 36.6 - 50.3 %    MCV 82.1 80.0 - 99.0 FL    MCH 28.6 26.0 - 34.0 PG    MCHC 34.8 30.0 - 36.5 g/dL    RDW 12.1 11.5 - 14.5 %    PLATELET 692 861 - 462 K/uL    NEUTROPHILS 61 32 - 75 %    LYMPHOCYTES 27 12 - 49 %    MONOCYTES 11 5 - 13 %    EOSINOPHILS 1 0 - 7 %    BASOPHILS 0 0 - 1 %    ABS. NEUTROPHILS 6.3 1.8 - 8.0 K/UL    ABS. LYMPHOCYTES 2.7 0.8 - 3.5 K/UL    ABS. MONOCYTES 1.2 (H) 0.0 - 1.0 K/UL    ABS. EOSINOPHILS 0.1 0.0 - 0.4 K/UL    ABS. BASOPHILS 0.0 0.0 - 0.1 K/UL   METABOLIC PANEL, COMPREHENSIVE    Collection Time: 09/13/17  1:32 PM   Result Value Ref Range    Sodium 140 136 - 145 mmol/L    Potassium 3.7 3.5 - 5.1 mmol/L    Chloride 107 97 - 108 mmol/L    CO2 25 21 - 32 mmol/L    Anion gap 8 5 - 15 mmol/L    Glucose 90 65 - 100 mg/dL    BUN 6 6 - 20 MG/DL    Creatinine 1.04 0.70 - 1.30 MG/DL    BUN/Creatinine ratio 6 (L) 12 - 20      GFR est AA >60 >60 ml/min/1.73m2    GFR est non-AA >60 >60 ml/min/1.73m2    Calcium 8.6 8.5 - 10.1 MG/DL    Bilirubin, total 1.4 (H) 0.2 - 1.0 MG/DL    ALT (SGPT) 34 12 - 78 U/L    AST (SGOT) 32 15 - 37 U/L    Alk.  phosphatase 68 45 - 117 U/L    Protein, total 7.7 6.4 - 8.2 g/dL    Albumin 3.8 3.5 - 5.0 g/dL    Globulin 3.9 2.0 - 4.0 g/dL    A-G Ratio 1.0 (L) 1.1 - 2.2     TROPONIN I    Collection Time: 09/13/17  1:32 PM   Result Value Ref Range    Troponin-I, Qt. 3.45 (H) <0.05 ng/mL   POC TROPONIN-I    Collection Time: 09/13/17  1:32 PM   Result Value Ref Range    Troponin-I (POC) 1.78 (H) 0.00 - 0.08 ng/mL   EKG, 12 LEAD, SUBSEQUENT    Collection Time: 09/13/17  1:57 PM   Result Value Ref Range    Ventricular Rate 90 BPM    Atrial Rate 90 BPM    P-R Interval 324 ms    QRS Duration 86 ms    Q-T Interval 374 ms    QTC Calculation (Bezet) 457 ms    Calculated P Axis 49 degrees    Calculated R Axis 62 degrees    Calculated T Axis 9 degrees    Diagnosis       Sinus rhythm with 1st degree AV block  Possible Left atrial enlargement  ST elevation, consider early repolarization, pericarditis, or injury  T wave abnormality, consider inferior ischemia      Confirmed by Laveda Stain ([id number]) on 2017 4:01:59 PM         IMAGING RESULTS:  CXR Results  (Last 48 hours)               17 1349  XR CHEST PORT Final result    Impression:  Impression:   No acute cardiopulmonary process. Narrative:  Chest portable AP       History: Chest pain. Irregular heartbeat. Comparison: 2014       Findings: The lungs are well expanded. No focal consolidation, pleural   effusion, or pneumothorax. The cardiomediastinal silhouette is unremarkable. The visualized osseous structures are unremarkable. MEDICATIONS GIVEN:  Medications   sodium chloride 0.9 % bolus infusion 1,000 mL (0 mL IntraVENous IV Completed 17 1415)       IMPRESSION:  1. Narrow complex tachycardia (HCC)    2. Dehydration    3. Status post ablation of accessory bypass tract    4. First degree heart block        PLAN:  1.    Discharge Medication List as of 2017  2:51 PM      STOP taking these medications       flecainide (TAMBOCOR) 50 mg tablet Comments:   Reason for Stopping:         metoprolol succinate (TOPROL-XL) 25 mg XL tablet Comments:   Reason for Stoppin.   Follow-up Information     Follow up With Details Comments [address], [place] Schedule an appointment as soon as possible for a visit  Katerin Barcenas [phone]          Return to ED if worse     DISCHARGE NOTE  2:52 PM  The patient has been re-evaluated and is ready for discharge. Reviewed available results with patient. Counseled patient on diagnosis and care plan. Patient has expressed understanding, and all questions have been answered. Patient agrees with plan and agrees to follow up as recommended, or return to the ED if their symptoms worsen. Discharge instructions have been provided and explained to the patient, along with reasons to return to the ED. Attestation Note:  This note is prepared by Irvin Lianres, acting as Scribe for Wilma Yao MD.    Wilma Yao MD: The scribe's documentation has been prepared under my direction and personally reviewed by me in its entirety. I confirm that the note above accurately reflects all work, treatment, procedures, and medical decision making performed by me.

## 2017-09-13 NOTE — ED NOTES
Pt reports having an ablation Monday and was discharged from this hospital yesterday. Pt states that heart began \"racing\" and feeling like it just wasn't beating right during the night. Pt states that he feels \"winded\" also.

## 2017-09-13 NOTE — ED NOTES
Discharge instructions reviewed w/ pt and copy given by Dr. Adeola Snell. Pt discharged ambulatory from ED.

## 2017-09-14 NOTE — CONSULTS
Subjective:                [address] [address]  [phone]    Date of  Admission: 9/13/2017 12:47 PM     Admission type:Emergency    Olivier Webber is a 23 y.o. male had AVRT ablation on Monday that was partially successful. He had his LLAP ablated but his RLAP pathway was still inducible. He was started on flec and bb and dc/d home but presented today with palpitations and sob. Patient Active Problem List    Diagnosis Date Noted    SVT (supraventricular tachycardia) ([address] 09/11/2017    SOB (shortness of breath) 08/31/2017    Paroxysmal supraventricular tachycardia ([address] 07/02/2012    Other dyspnea and respiratory abnormality 07/02/2012    WPW (Hunter-Parkinson-White syndrome) 05/31/2012    Angina at rest [facility]) 05/31/2012      None  Past Medical History:   Diagnosis Date    Angina at rest [facility])     WPW (Hunter-Parkinson-White syndrome)       Past Surgical History:   Procedure Laterality Date    CARDIAC SURG PROCEDURE UNLIST  2014    Cardiac ablation ([facility] WPW)     No Known Allergies  Social History   Substance Use Topics    Smoking status: Never Smoker    Smokeless tobacco: Never Used      Comment: 1 CIGAR DAILY    Alcohol use Yes      Comment: occasional     Family History   Problem Relation Age of Onset    Arrhythmia Father       No current facility-administered medications for this encounter. No current outpatient prescriptions on file.          Review of Symptoms:  Constitutional: negative  Eyes: negative  Ears, nose, mouth, throat, and face: negative  Respiratory: sob  Cardiovascular: palpitations  Gastrointestinal: negative  Genitourinary: negative  Musculoskeletal: negative  Neurological: negative  Behvioral/Psych: negative  Endocrine: negative     Subjective:      Visit Vitals    /84    Pulse (!) 104    Temp 98.3 °F (36.8 °C)    Resp 15    Ht 6' 1\" (1.854 m)    Wt 231 lb 14.8 oz (105.2 kg)    SpO2 100%    BMI 30.6 kg/m2       Physical Exam  Abdomen: soft, non-tender. Extremities: extremities normal  Heart: regular rate and rhythm  Lungs: clear to auscultation bilaterally  Pulses: 2+ and symmetric    Cardiographics    Telemetry: svt    ECG: svt  Ecg1: sinus tach with marked first degree av block      Labs:  Recent Labs      09/13/17   1332   WBC  10.3   HGB  13.9   HCT  39.9   PLT  272     Recent Labs      09/13/17   1332   NA  140   K  3.7   CL  107   CO2  25   GLU  90   BUN  6   CREA  1.04   CA  8.6   ALB  3.8   TBILI  1.4*   SGOT  32   ALT  34       Recent Labs      09/13/17   1332   TROIQ  3.45*       No intake or output data in the 24 hours ending 09/14/17 0906      Assessment:     Assessment:       Active Problems:    Paroxysmal supraventricular tachycardia ([address] (7/2/2012)    palpitations     Plan:     Ruba Parra is a young man with a RLAP pathway that could not be ablated. He was started on flec/bb and returned with recurrence and first degree av block. We will stop his med rx due to the marked first degree av block. The pathway may be epicardial in nature and will likely warrant an eval at [facility] for that. Kishore Thomas for [employer] with f/u next week. He is in sinus and asymptomatic currently.       Kelly Reinoso MD, [facility]    9/14/2017

## 2017-09-21 ENCOUNTER — OFFICE VISIT (OUTPATIENT)
Dept: CARDIOLOGY CLINIC | Age: 27
End: 2017-09-21

## 2017-09-21 VITALS
WEIGHT: 230.5 LBS | OXYGEN SATURATION: 98 % | SYSTOLIC BLOOD PRESSURE: 100 MMHG | HEIGHT: 73 IN | BODY MASS INDEX: 30.55 KG/M2 | DIASTOLIC BLOOD PRESSURE: 60 MMHG | RESPIRATION RATE: 16 BRPM | HEART RATE: 90 BPM

## 2017-09-21 DIAGNOSIS — I47.1 PAROXYSMAL SUPRAVENTRICULAR TACHYCARDIA (HCC): Chronic | ICD-10-CM

## 2017-09-21 DIAGNOSIS — R00.2 PALPITATIONS: ICD-10-CM

## 2017-09-21 DIAGNOSIS — I45.6 WPW (WOLFF-PARKINSON-WHITE SYNDROME): Primary | Chronic | ICD-10-CM

## 2017-09-21 RX ORDER — FLECAINIDE ACETATE 50 MG/1
TABLET ORAL
Refills: 2 | COMMUNITY
Start: 2017-09-12 | End: 2021-12-18 | Stop reason: SDUPTHER

## 2017-09-21 NOTE — PROGRESS NOTES
Subjective:      Robert Bolden is a 23 y.o. male is here for follow up s/p AVRT ablation 9/11/2017 that was partially successful. He had his LLAP ablated; however, RLAP pathway was still inducible. He was started on Flecainide/Metoprolol but returned to ER with palpitations and sob. He had marked first degree AVB during ER visit, and medications were discontinued. The patient denies chest pain/ shortness of breath, orthopnea, PND, LE edema, palpitations, syncope, presyncope or fatigue. Patient Active Problem List    Diagnosis Date Noted    SVT (supraventricular tachycardia) ([facility] 09/11/2017    SOB (shortness of breath) 08/31/2017    Paroxysmal supraventricular tachycardia ([facility] 07/02/2012    Other dyspnea and respiratory abnormality 07/02/2012    WPW (Hunter-Parkinson-White syndrome) 05/31/2012    Angina at rest [facility]) 05/31/2012      None  Past Medical History:   Diagnosis Date    Angina at rest [facility])     WPW (Hunter-Parkinson-White syndrome)       Past Surgical History:   Procedure Laterality Date    CARDIAC SURG PROCEDURE UNLIST  2014    Cardiac ablation ([facility])     No Known Allergies   Family History   Problem Relation Age of Onset    Arrhythmia Father     negative for cardiac disease  Social History     Social History    Marital status: SINGLE     Spouse name: N/A    Number of children: N/A    Years of education: N/A     Social History Main Topics    Smoking status: Never Smoker    Smokeless tobacco: Never Used      Comment: 1 CIGAR DAILY    Alcohol use Yes      Comment: occasional    Drug use: Yes     Special: Marijuana      Comment: stated he smokes everyday, twice a day    Sexual activity: Yes     Partners: Female     Birth control/ protection: Condom     Other Topics Concern    Not on file     Social History Narrative     No current outpatient prescriptions on file. No current facility-administered medications for this visit.        There were no vitals filed for this visit.    I have reviewed the nurses notes, vitals, problem list, allergy list, medical history, family, social history and medications. Review of Symptoms:    General: Pt denies excessive weight gain or loss. Pt is able to conduct ADL's  HEENT: Denies blurred vision, headaches, epistaxis and difficulty swallowing. Respiratory: Denies shortness of breath, CHRISTIANSON, wheezing or stridor. Cardiovascular: +palpitationsDenies precordial pain, palpitations, edema or PND  Gastrointestinal: Denies poor appetite, indigestion, abdominal pain or blood in stool  Urinary: Denies dysuria, pyuria  Musculoskeletal: Denies pain or swelling from muscles or joints  Neurologic: Denies tremor, paresthesias, or sensory motor disturbance  Skin: Denies rash, itching or texture change. Psych: Denies depression      Physical Exam:      General: Well developed, in no acute distress. HEENT: Eyes - PERRL, no jvd  Heart:  Normal S1/S2 negative S3 or S4. Regular, no murmur, gallop or rub.   Respiratory: Clear bilaterally x 4, no wheezing or rales  Abdomen:   Soft, non-tender, bowel sounds are active.   Extremities:  No edema, normal cap refill, no cyanosis. Musculoskeletal: No clubbing  Neuro: A&Ox3, speech clear, gait stable. Skin: Skin color is normal. No rashes or lesions.  Non diaphoretic  Vascular: 2+ pulses symmetric in all extremities    Cardiographics    Ekg: sinus rhythm with first degree AVB    Results for orders placed or performed during the hospital encounter of 09/13/17   EKG, 12 LEAD, INITIAL   Result Value Ref Range    Ventricular Rate 104 BPM    Atrial Rate 104 BPM    P-R Interval 424 ms    QRS Duration 84 ms    Q-T Interval 344 ms    QTC Calculation (Bezet) 452 ms    Calculated P Axis 26 degrees    Calculated R Axis 73 degrees    Calculated T Axis 21 degrees    Diagnosis       Sinus tachycardia with 1st degree AV block  Confirmed by Remedios Rajan ([id number]) on 9/13/2017 4:02:29 PM           Lab Results   Component Value Date/Time    WBC 10.3 09/13/2017 01:32 PM    HGB 13.9 09/13/2017 01:32 PM    HCT 39.9 09/13/2017 01:32 PM    PLATELET 430 01/97/8037 01:32 PM    MCV 82.1 09/13/2017 01:32 PM      Lab Results   Component Value Date/Time    Sodium 140 09/13/2017 01:32 PM    Potassium 3.7 09/13/2017 01:32 PM    Chloride 107 09/13/2017 01:32 PM    CO2 25 09/13/2017 01:32 PM    Anion gap 8 09/13/2017 01:32 PM    Glucose 90 09/13/2017 01:32 PM    BUN 6 09/13/2017 01:32 PM    Creatinine 1.04 09/13/2017 01:32 PM    BUN/Creatinine ratio 6 09/13/2017 01:32 PM    GFR est AA >60 09/13/2017 01:32 PM    GFR est non-AA >60 09/13/2017 01:32 PM    Calcium 8.6 09/13/2017 01:32 PM    Bilirubin, total 1.4 09/13/2017 01:32 PM    AST (SGOT) 32 09/13/2017 01:32 PM    Alk. phosphatase 68 09/13/2017 01:32 PM    Protein, total 7.7 09/13/2017 01:32 PM    Albumin 3.8 09/13/2017 01:32 PM    Globulin 3.9 09/13/2017 01:32 PM    A-G Ratio 1.0 09/13/2017 01:32 PM    ALT (SGPT) 34 09/13/2017 01:32 PM         Assessment:     Assessment:        ICD-10-CM ICD-9-CM    1. WPW (Hunter-Parkinson-White syndrome) I45.6 426.7    2. Paroxysmal supraventricular tachycardia (HCC) I47.1 427.0      No orders of the defined types were placed in this encounter. Plan:   Mr. Tan Dougherty is here for follow up of AVRT. EKG today demonstrates NSR with first degree AVB. His medical therapy was stopped during ER visit due to first degree AVB; however he has been taking 50mg Flecainide PRN for palpitations, they resolve within an hour of taking medication. He is taking this most days since discharge from ER. Dr. Magui Stephen felt that his pathway may be epicardial in nature therefore will provide referral to [facility] for RLAP pathway unable to be ablated per Dr. Ascencion Zhao. Follow up in 3 months. Thank you for allowing me to participate in Dion Saenz 's care.     Rachid Vivar NP

## 2017-09-21 NOTE — MR AVS SNAPSHOT
Visit Information Date & Time Provider Department Dept. Phone Encounter #  
 9/21/2017  1:00 PM Denver Rundle, [address] [facility] [phone] [id number] Your Appointments 9/21/2017  1:00 PM  
HOSPITAL FOLLOW-UP with Denver Rundle, NP [facility]) Appt Note: $100cp  ekr [id number] [facility]  
[phone] [id number] [facility]  
  
    
 12/12/2017  2:15 PM  
3 MONTH with MD [facility]) Appt Note: per Neha Butler,  $100pymt   ekr [id number] [facility]  
[phone] [id number] [facility] Upcoming Health Maintenance Date Due DTaP/Tdap/Td series (1 - Tdap) 11/13/2012 INFLUENZA AGE 9 TO ADULT 8/1/2017 Allergies as of 9/21/2017  Review Complete On: 9/21/2017 By: Denver Rundle, NP No Known Allergies Current Immunizations  Never Reviewed No immunizations on file. Not reviewed this visit You Were Diagnosed With   
  
 Codes Comments WPW (Hunter-Parkinson-White syndrome)    -  Primary ICD-10-CM: I45.6 ICD-9-CM: 426.7 Paroxysmal supraventricular tachycardia (HCC)     ICD-10-CM: I47.1 ICD-9-CM: 427.0 Palpitations     ICD-10-CM: R00.2 ICD-9-CM: 785.1 Vitals BP Pulse Resp Height(growth percentile) Weight(growth percentile) SpO2  
 100/60 (BP 1 Location: Right arm, BP Patient Position: Sitting) 90 16 6' 1\" (1.854 m) 230 lb 8 oz (104.6 kg) 98% BMI Smoking Status 30.41 kg/m2 Never Smoker Vitals History BMI and BSA Data Body Mass Index Body Surface Area  
 30.41 kg/m 2 2.32 m 2 Your Updated Medication List  
  
   
This list is accurate as of: 9/21/17 12:58 PM.  Always use your most recent med list.  
  
  
  
  
 flecainide 50 mg tablet Commonly known as:  TAMBOCOR  
 TK 1 T PO Q 12 H We Performed the Following AMB POC EKG ROUTINE W/ 12 LEADS, INTER & REP [05118 CPT(R)] REFERRAL TO CARDIOLOGY [LZL04 Custom] Comments:  
 Please evaluate patient for unable to ablate RLAP, possible epicardial pathway per Dr. Ignacia Rodriguez.  
  
Referral Information Referral ID Referred By Referred To  
  
 [id number] Jinny Caldwell MD   
   [address] [place] [address] Phone: [phone] Fax: [phone] Visits Status Start Date End Date 1 New Request 9/21/17 9/21/18 If your referral has a status of pending review or denied, additional information will be sent to support the outcome of this decision. Introducing [facility] & HEALTH SERVICES! Nette Longoria introduces [employer] patient portal. Now you can access parts of your medical record, email your doctor's office, and request medication refills online. 1. In your internet browser, go to [email or web address] 2. Click on the First Time User? Click Here link in the Sign In box. You will see the New Member Sign Up page. 3. Enter your [employer] Access Code exactly as it appears below. You will not need to use this code after youve completed the sign-up process. If you do not sign up before the expiration date, you must request a new code. · [employer] Access Code: [email or web address] Expires: 11/29/2017  8:30 AM 
 
4. Enter the last four digits of your Social Security Number (xxxx) and Date of Birth (mm/dd/yyyy) as indicated and click Submit. You will be taken to the next sign-up page. 5. Create a [employer] ID. This will be your [employer] login ID and cannot be changed, so think of one that is secure and easy to remember. 6. Create a [employer] password. You can change your password at any time. 7. Enter your Password Reset Question and Answer. This can be used at a later time if you forget your password. 8. Enter your e-mail address. You will receive e-mail notification when new information is available in [address] 9. Click Sign Up. You can now view and download portions of your medical record. 10. Click the Download Summary menu link to download a portable copy of your medical information. If you have questions, please visit the Frequently Asked Questions section of the [employer] website. Remember, [employer] is NOT to be used for urgent needs. For medical emergencies, dial 911. Now available from your iPhone and Android! Please provide this summary of care documentation to your next provider. Your primary care clinician is listed as NONE. If you have any questions after today's visit, please call [phone].

## 2018-01-16 ENCOUNTER — OFFICE VISIT (OUTPATIENT)
Dept: CARDIOLOGY CLINIC | Age: 28
End: 2018-01-16

## 2018-01-16 VITALS
OXYGEN SATURATION: 99 % | RESPIRATION RATE: 18 BRPM | HEIGHT: 73 IN | HEART RATE: 68 BPM | DIASTOLIC BLOOD PRESSURE: 78 MMHG | BODY MASS INDEX: 30.4 KG/M2 | SYSTOLIC BLOOD PRESSURE: 110 MMHG | WEIGHT: 229.4 LBS

## 2018-01-16 DIAGNOSIS — I45.6 WPW (WOLFF-PARKINSON-WHITE SYNDROME): Chronic | ICD-10-CM

## 2018-01-16 DIAGNOSIS — I49.9 IRREGULAR HEART BEATS: Primary | ICD-10-CM

## 2018-01-16 DIAGNOSIS — I47.1 PAROXYSMAL SUPRAVENTRICULAR TACHYCARDIA (HCC): Chronic | ICD-10-CM

## 2018-01-16 RX ORDER — METOPROLOL SUCCINATE 25 MG/1
12.5 TABLET, EXTENDED RELEASE ORAL DAILY
Qty: 30 TAB | Refills: 3 | Status: SHIPPED | OUTPATIENT
Start: 2018-01-16 | End: 2021-12-18 | Stop reason: SDUPTHER

## 2018-01-16 RX ORDER — METOPROLOL SUCCINATE 25 MG/1
12.5 TABLET, EXTENDED RELEASE ORAL DAILY
Qty: 30 TAB | Refills: 3 | Status: SHIPPED | OUTPATIENT
Start: 2018-01-16 | End: 2018-01-16 | Stop reason: SDUPTHER

## 2018-01-16 NOTE — PROGRESS NOTES
1. Have you been to the ER, urgent care clinic since your last visit? Hospitalized since your last visit? No    2. Have you seen or consulted any other health care providers outside of the [address] [place] [address] since your last visit? Include any pap smears or colon screening. No    Chief Complaint   Patient presents with    Irregular Heart Beat     3 mo appt.,  Rare palps.

## 2021-12-18 ENCOUNTER — HOSPITAL ENCOUNTER (EMERGENCY)
Age: 31
Discharge: HOME | End: 2021-12-18
Attending: EMERGENCY MEDICINE
Payer: COMMERCIAL

## 2021-12-18 VITALS
RESPIRATION RATE: 11 BRPM | OXYGEN SATURATION: 98 % | HEIGHT: 74 IN | TEMPERATURE: 99.4 F | BODY MASS INDEX: 30.3 KG/M2 | DIASTOLIC BLOOD PRESSURE: 74 MMHG | SYSTOLIC BLOOD PRESSURE: 117 MMHG | WEIGHT: 236.11 LBS | HEART RATE: 64 BPM

## 2021-12-18 DIAGNOSIS — I47.1 AVNRT (AV NODAL RE-ENTRY TACHYCARDIA) (HCC): Primary | ICD-10-CM

## 2021-12-18 DIAGNOSIS — I45.6 WPW (WOLFF-PARKINSON-WHITE SYNDROME): Chronic | ICD-10-CM

## 2021-12-18 DIAGNOSIS — I47.1 PAROXYSMAL SUPRAVENTRICULAR TACHYCARDIA (HCC): Chronic | ICD-10-CM

## 2021-12-18 LAB
ALBUMIN SERPL-MCNC: 4 G/DL (ref 3.5–5)
ALBUMIN/GLOB SERPL: 1.1 {RATIO} (ref 1.1–2.2)
ALP SERPL-CCNC: 72 U/L (ref 45–117)
ALT SERPL-CCNC: 35 U/L (ref 12–78)
ANION GAP SERPL CALC-SCNC: 4 MMOL/L (ref 5–15)
AST SERPL-CCNC: 24 U/L (ref 15–37)
BASOPHILS # BLD: 0.1 K/UL (ref 0–0.1)
BASOPHILS NFR BLD: 1 % (ref 0–1)
BILIRUB SERPL-MCNC: 0.9 MG/DL (ref 0.2–1)
BNP SERPL-MCNC: 104 PG/ML
BUN SERPL-MCNC: 7 MG/DL (ref 6–20)
BUN/CREAT SERPL: 6 (ref 12–20)
CALCIUM SERPL-MCNC: 9.5 MG/DL (ref 8.5–10.1)
CHLORIDE SERPL-SCNC: 109 MMOL/L (ref 97–108)
CO2 SERPL-SCNC: 27 MMOL/L (ref 21–32)
CREAT SERPL-MCNC: 1.11 MG/DL (ref 0.7–1.3)
DIFFERENTIAL METHOD BLD: NORMAL
EOSINOPHIL # BLD: 0.1 K/UL (ref 0–0.4)
EOSINOPHIL NFR BLD: 1 % (ref 0–7)
ERYTHROCYTE [DISTWIDTH] IN BLOOD BY AUTOMATED COUNT: 11.9 % (ref 11.5–14.5)
GLOBULIN SER CALC-MCNC: 3.8 G/DL (ref 2–4)
GLUCOSE SERPL-MCNC: 85 MG/DL (ref 65–100)
HCT VFR BLD AUTO: 41.7 % (ref 36.6–50.3)
HGB BLD-MCNC: 14.3 G/DL (ref 12.1–17)
IMM GRANULOCYTES # BLD AUTO: 0 K/UL (ref 0–0.04)
IMM GRANULOCYTES NFR BLD AUTO: 0 % (ref 0–0.5)
LYMPHOCYTES # BLD: 2 K/UL (ref 0.8–3.5)
LYMPHOCYTES NFR BLD: 30 % (ref 12–49)
MAGNESIUM SERPL-MCNC: 2 MG/DL (ref 1.6–2.4)
MCH RBC QN AUTO: 29 PG (ref 26–34)
MCHC RBC AUTO-ENTMCNC: 34.3 G/DL (ref 30–36.5)
MCV RBC AUTO: 84.6 FL (ref 80–99)
MONOCYTES # BLD: 0.6 K/UL (ref 0–1)
MONOCYTES NFR BLD: 9 % (ref 5–13)
NEUTS SEG # BLD: 3.9 K/UL (ref 1.8–8)
NEUTS SEG NFR BLD: 59 % (ref 32–75)
NRBC # BLD: 0 K/UL (ref 0–0.01)
NRBC BLD-RTO: 0 PER 100 WBC
PLATELET # BLD AUTO: 297 K/UL (ref 150–400)
PMV BLD AUTO: 9.2 FL (ref 8.9–12.9)
POTASSIUM SERPL-SCNC: 3.9 MMOL/L (ref 3.5–5.1)
PROT SERPL-MCNC: 7.8 G/DL (ref 6.4–8.2)
RBC # BLD AUTO: 4.93 M/UL (ref 4.1–5.7)
SODIUM SERPL-SCNC: 140 MMOL/L (ref 136–145)
TROPONIN-HIGH SENSITIVITY: 4 NG/L (ref 0–76)
WBC # BLD AUTO: 6.7 K/UL (ref 4.1–11.1)

## 2021-12-18 PROCEDURE — 84484 ASSAY OF TROPONIN QUANT: CPT

## 2021-12-18 PROCEDURE — 83735 ASSAY OF MAGNESIUM: CPT

## 2021-12-18 PROCEDURE — 85025 COMPLETE CBC W/AUTO DIFF WBC: CPT

## 2021-12-18 PROCEDURE — 83880 ASSAY OF NATRIURETIC PEPTIDE: CPT

## 2021-12-18 PROCEDURE — 93005 ELECTROCARDIOGRAM TRACING: CPT

## 2021-12-18 PROCEDURE — 99284 EMERGENCY DEPT VISIT MOD MDM: CPT

## 2021-12-18 PROCEDURE — 36415 COLL VENOUS BLD VENIPUNCTURE: CPT

## 2021-12-18 PROCEDURE — 74011250637 HC RX REV CODE- 250/637: Performed by: EMERGENCY MEDICINE

## 2021-12-18 PROCEDURE — 80053 COMPREHEN METABOLIC PANEL: CPT

## 2021-12-18 PROCEDURE — 74011250636 HC RX REV CODE- 250/636: Performed by: EMERGENCY MEDICINE

## 2021-12-18 RX ORDER — METOPROLOL SUCCINATE 25 MG/1
12.5 TABLET, EXTENDED RELEASE ORAL DAILY
Qty: 30 TABLET | Refills: 3 | Status: SHIPPED | OUTPATIENT
Start: 2021-12-18 | End: 2022-01-21

## 2021-12-18 RX ORDER — METOPROLOL SUCCINATE 50 MG/1
25 TABLET, EXTENDED RELEASE ORAL
Status: COMPLETED | OUTPATIENT
Start: 2021-12-18 | End: 2021-12-18

## 2021-12-18 RX ORDER — FLECAINIDE ACETATE 50 MG/1
50 TABLET ORAL
Status: COMPLETED | OUTPATIENT
Start: 2021-12-18 | End: 2021-12-18

## 2021-12-18 RX ORDER — FLECAINIDE ACETATE 50 MG/1
TABLET ORAL
Qty: 60 TABLET | Refills: 2 | Status: SHIPPED | OUTPATIENT
Start: 2021-12-18 | End: 2022-01-21

## 2021-12-18 RX ADMIN — SODIUM CHLORIDE 1000 ML: 9 INJECTION, SOLUTION INTRAVENOUS at 18:04

## 2021-12-18 RX ADMIN — METOPROLOL SUCCINATE 25 MG: 50 TABLET, EXTENDED RELEASE ORAL at 18:56

## 2021-12-18 RX ADMIN — FLECAINIDE ACETATE 50 MG: 50 TABLET ORAL at 19:02

## 2021-12-18 NOTE — ED PROVIDER NOTES
EMERGENCY DEPARTMENT HISTORY AND PHYSICAL EXAM      Date: 12/18/2021  Patient Name: Matilda Moreno    Please note that this dictation was completed with imbookin (Pogby), the computer voice recognition software. Quite often unanticipated grammatical, syntax, homophones, and other interpretive errors are inadvertently transcribed by the computer software. Please disregard these errors. Please excuse any errors that have escaped final proofreading. History of Presenting Illness     Chief Complaint   Patient presents with    Palpitations     he has Tillman parkinson white; he has had heart beating funny for two weeks; he did make an appointment withDr. Bisi Troy; pt has had two ablations       History Provided By: Patient     HPI: Matilda Moreno, 28 y.o. male, with a past medical history significant for WPW status post ablation for AVRT presented the emergency department complaining of palpitations. He is off metoprolol and off flecainide. He has not seen his [address] [place] [address] cardiologist in about 3 years he has also been referred to [facility]. He reports that he was told to stop taking the metoprolol. He comes in today for palpitations that have been getting worse over the last several days. Its associated with feeling of lightheadedness, fatigue malaise. He gets short of breath when the symptoms come on. Nothing makes the symptoms better, they brought all on their own but they are coming more frequent. Patient denies any fever, chills, cough. No nausea vomiting diarrhea. PCP: None    No current facility-administered medications on file prior to encounter. Current Outpatient Medications on File Prior to Encounter   Medication Sig Dispense Refill    [DISCONTINUED] metoprolol succinate (TOPROL-XL) 25 mg XL tablet Take 0.5 Tabs by mouth daily.  30 Tab 3    [DISCONTINUED] flecainide (TAMBOCOR) 50 mg tablet TK 1 T PO Q 12 H  2       Past History     Past Medical History:  Past Medical History:   Diagnosis Date    [facility]     WPW (Hunter-Parkinson-White syndrome)        Past Surgical History:  Past Surgical History:   Procedure Laterality Date    CARDIAC SURG PROCEDURE UNLIST  2014    Cardiac ablation ([facility])       Family History:  Family History   Problem Relation Age of Onset    Arrhythmia Father        Social History:  Social History     Tobacco Use    Smoking status: Never Smoker    Smokeless tobacco: Never Used   Substance Use Topics    Alcohol use: Yes     Comment: occasional    Drug use: Yes     Types: Marijuana     Comment: stated he smokes everyday, twice a day       Allergies:  No Known Allergies      Review of Systems   Review of Systems   Constitutional: Negative for chills and fever. HENT: Negative for congestion and sore throat. Eyes: Negative for visual disturbance. Respiratory: Positive for shortness of breath. Negative for cough. Cardiovascular: Positive for palpitations. Negative for chest pain and leg swelling. Gastrointestinal: Negative for abdominal pain, blood in stool, diarrhea and nausea. Endocrine: Negative for polyuria. Genitourinary: Negative for dysuria and testicular pain. Musculoskeletal: Negative for arthralgias, joint swelling and myalgias. Skin: Negative for rash. Allergic/Immunologic: Negative for immunocompromised state. Neurological: Positive for light-headedness. Negative for weakness and headaches. Hematological: Does not bruise/bleed easily. Psychiatric/Behavioral: Negative for confusion. Physical Exam   Physical Exam  Vitals and nursing note reviewed. Constitutional:       Appearance: He is well-developed. HENT:      Head: Normocephalic and atraumatic. Eyes:      General:         Right eye: No discharge. Left eye: No discharge. Conjunctiva/sclera: Conjunctivae normal.      Pupils: Pupils are equal, round, and reactive to light. Neck:      Trachea: No tracheal deviation. Cardiovascular:      Rate and Rhythm: Regular rhythm. Tachycardia present. Heart sounds: Normal heart sounds. No murmur heard. Pulmonary:      Effort: Pulmonary effort is normal. No respiratory distress. Breath sounds: Normal breath sounds. No wheezing or rales. Abdominal:      General: Bowel sounds are normal.      Palpations: Abdomen is soft. Tenderness: There is no abdominal tenderness. There is no guarding or rebound. Musculoskeletal:         General: No tenderness or deformity. Normal range of motion. Cervical back: Normal range of motion and neck supple. Skin:     General: Skin is warm and dry. Findings: No erythema or rash. Neurological:      Mental Status: He is alert and oriented to person, place, and time. Psychiatric:         Behavior: Behavior normal.         Diagnostic Study Results     Labs -     Recent Results (from the past 12 hour(s))   CBC WITH AUTOMATED DIFF    Collection Time: 12/18/21  4:59 PM   Result Value Ref Range    WBC 6.7 4.1 - 11.1 K/uL    RBC 4.93 4.10 - 5.70 M/uL    HGB 14.3 12.1 - 17.0 g/dL    HCT 41.7 36.6 - 50.3 %    MCV 84.6 80.0 - 99.0 FL    MCH 29.0 26.0 - 34.0 PG    MCHC 34.3 30.0 - 36.5 g/dL    RDW 11.9 11.5 - 14.5 %    PLATELET 583 155 - 744 K/uL    MPV 9.2 8.9 - 12.9 FL    NRBC 0.0 0  WBC    ABSOLUTE NRBC 0.00 0.00 - 0.01 K/uL    NEUTROPHILS 59 32 - 75 %    LYMPHOCYTES 30 12 - 49 %    MONOCYTES 9 5 - 13 %    EOSINOPHILS 1 0 - 7 %    BASOPHILS 1 0 - 1 %    IMMATURE GRANULOCYTES 0 0.0 - 0.5 %    ABS. NEUTROPHILS 3.9 1.8 - 8.0 K/UL    ABS. LYMPHOCYTES 2.0 0.8 - 3.5 K/UL    ABS. MONOCYTES 0.6 0.0 - 1.0 K/UL    ABS. EOSINOPHILS 0.1 0.0 - 0.4 K/UL    ABS. BASOPHILS 0.1 0.0 - 0.1 K/UL    ABS. IMM.  GRANS. 0.0 0.00 - 0.04 K/UL    DF AUTOMATED     METABOLIC PANEL, COMPREHENSIVE    Collection Time: 12/18/21  4:59 PM   Result Value Ref Range    Sodium 140 136 - 145 mmol/L    Potassium 3.9 3.5 - 5.1 mmol/L    Chloride 109 (H) 97 - 108 mmol/L    CO2 27 21 - 32 mmol/L    Anion gap 4 (L) 5 - 15 mmol/L    Glucose 85 65 - 100 mg/dL    BUN 7 6 - 20 MG/DL    Creatinine 1.11 0.70 - 1.30 MG/DL    BUN/Creatinine ratio 6 (L) 12 - 20      GFR est AA >60 >60 ml/min/1.73m2    GFR est non-AA >60 >60 ml/min/1.73m2    Calcium 9.5 8.5 - 10.1 MG/DL    Bilirubin, total 0.9 0.2 - 1.0 MG/DL    ALT (SGPT) 35 12 - 78 U/L    AST (SGOT) 24 15 - 37 U/L    Alk. phosphatase 72 45 - 117 U/L    Protein, total 7.8 6.4 - 8.2 g/dL    Albumin 4.0 3.5 - 5.0 g/dL    Globulin 3.8 2.0 - 4.0 g/dL    A-G Ratio 1.1 1.1 - 2.2     TROPONIN-HIGH SENSITIVITY    Collection Time: 12/18/21  4:59 PM   Result Value Ref Range    Troponin-High Sensitivity 4 0 - 76 ng/L   NT-PRO BNP    Collection Time: 12/18/21  4:59 PM   Result Value Ref Range    NT pro- <125 PG/ML   MAGNESIUM    Collection Time: 12/18/21  4:59 PM   Result Value Ref Range    Magnesium 2.0 1.6 - 2.4 mg/dL       Radiologic Studies -   No orders to display     CT Results  (Last 48 hours)    None        CXR Results  (Last 48 hours)    None            Medical Decision Making   I am the first provider for this patient. I reviewed the vital signs, available nursing notes, past medical history, past surgical history, family history and social history. Vital Signs-Reviewed the patient's vital signs. Patient Vitals for the past 12 hrs:   Temp Pulse Resp BP SpO2   12/18/21 1902  64  117/74    12/18/21 1856  65  117/74    12/18/21 1834  68 11 123/89 98 %   12/18/21 1745  71 17 120/83 98 %   12/18/21 1648  (!) 165      12/18/21 1646 99.4 °F (37.4 °C) 96 14 134/84 100 %       EKG interpretation: (Preliminary)  EKG shows what initially is a sinus rhythm and then changes into what looks like AVNRT. No evidence of ST elevation myocardial infarction, nonspecific ST changes. No delta wave. QTc 475. QRS 76 ms. Interpreted by me    Records Reviewed:   Nursing notes, Prior visits     Provider Notes (Medical Decision Making):   Patient here with increasing palpitations.   Looks to be going into an SVT. He looks well and nontoxic here. Discussed with Dr. Zayra Jamil, cardiology who will review notes further and decide about management. ED Course:   Initial assessment performed. The patients presenting problems have been discussed, and they are in agreement with the care plan formulated and outlined with them. I have encouraged them to ask questions as they arise throughout their visit. ED Course as of 12/18/21 1942   Sat Dec 18, 2021   1809 Ed consult: discussed with Dr. Tanisha Workman. Will review cardiology notes and get back to us for further recommendations. [AR]   2036 Discussed with Dr. Zayra Jamil, cardiology. Once patient on flecainide 50 mg twice daily and metoprolol 12.5 mg [AR]      ED Course User Index  [AR] Renetta Dang DO               Critical Care Time:   none    Disposition:    DISCHARGE NOTE  Patients results have been reviewed with them. Patient and/or family have verbally conveyed their understanding and agreement of the patient's signs, symptoms, diagnosis, treatment and prognosis and additionally agree to follow up as recommended or return to the Emergency Room should their condition change or have any new concerns prior to their follow-up appointment. Patient verbally agrees with the care-plan and verbally conveys that all of their questions have been answered. Discharge instructions have also been provided to the patient with some educational information regarding their diagnosis as well a list of reasons why they would want to return to the ER prior to their follow-up appointment should their condition change. PLAN:  1. Discharge Medication List as of 12/18/2021  7:24 PM      CONTINUE these medications which have CHANGED    Details   metoprolol succinate (TOPROL-XL) 25 mg XL tablet Take 0.5 Tablets by mouth daily. , Normal, Disp-30 Tablet, R-3      flecainide (TAMBOCOR) 50 mg tablet TK 1 T PO Q 12 H, Normal, Disp-60 Tablet, R-2           2.    Follow-up Information     Follow up With Specialties Details Why Ana Laura Fonseca MD Cardiology, [address] Vascular Surgery, Clinical Cardiac Electrophysiology  Go Tuesday for EKG check. [address]  [phone]      [facility] EMERGENCY DEPT Emergency Medicine  If symptoms worsen [address] [place] [address]  [phone]          Return to ED if worse     Diagnosis     Clinical Impression:   1. AVNRT (AV mario re-entry tachycardia) ([address]    2. WPW (Hunter-Parkinson-White syndrome)    3. Paroxysmal supraventricular tachycardia ([address]        Attestations:   This note was completed by Lorenzo Dailey DO

## 2021-12-18 NOTE — ED NOTES
1730 pt place on stretcher in room. Monitors placed. C/o fluttering feeling with a hx wpw. Voices no other complaints  1735 Dr Mckay Mesa in to see pt.  Librado Fraction  Pt given ice.

## 2021-12-19 LAB
ATRIAL RATE: 129 BPM
CALCULATED R AXIS, ECG10: 77 DEGREES
CALCULATED T AXIS, ECG11: -30 DEGREES
DIAGNOSIS, 93000: NORMAL
Q-T INTERVAL, ECG07: 326 MS
QRS DURATION, ECG06: 76 MS
QTC CALCULATION (BEZET), ECG08: 475 MS
VENTRICULAR RATE, ECG03: 128 BPM

## 2022-01-04 ENCOUNTER — OFFICE VISIT (OUTPATIENT)
Dept: CARDIOLOGY CLINIC | Age: 32
End: 2022-01-04
Payer: COMMERCIAL

## 2022-01-04 VITALS
HEART RATE: 68 BPM | WEIGHT: 236.8 LBS | OXYGEN SATURATION: 99 % | BODY MASS INDEX: 30.39 KG/M2 | RESPIRATION RATE: 18 BRPM | DIASTOLIC BLOOD PRESSURE: 64 MMHG | HEIGHT: 74 IN | SYSTOLIC BLOOD PRESSURE: 102 MMHG

## 2022-01-04 DIAGNOSIS — R00.2 PALPITATIONS: ICD-10-CM

## 2022-01-04 DIAGNOSIS — I47.1 PAROXYSMAL SUPRAVENTRICULAR TACHYCARDIA (HCC): Primary | ICD-10-CM

## 2022-01-04 PROCEDURE — 99205 OFFICE O/P NEW HI 60 MIN: CPT | Performed by: INTERNAL MEDICINE

## 2022-01-04 PROCEDURE — 93000 ELECTROCARDIOGRAM COMPLETE: CPT | Performed by: INTERNAL MEDICINE

## 2022-01-04 NOTE — PROGRESS NOTES
Chief Complaint   Patient presents with    Irregular Heart Beat     Overdue annual follow up - last seen 1/16/18- went to ER on 12/19/21 for palp - would have for 45 sec and stop for 3 sec - happened for 2 days - before that would feel a skipped beat and lightheadedness - will still feel a skip from time to time      1. Have you been to the ER, urgent care clinic since your last visit? Hospitalized since your last visit? Yes ED [facility] - see above     2. Have you seen or consulted any other health care providers outside of the [address] [place] [address] since your last visit? Include any pap smears or colon screening.   No

## 2022-01-04 NOTE — H&P (VIEW-ONLY)
Subjective:      Yady Rajput is a 28 y.o. male is here for EP consult. He is RFA of LAP in 2018 and has not been seen since. He presented to the ER with palpitations and was noted in the ER to go into SVT precipitated by a pac. He denies cp    Patient Active Problem List    Diagnosis Date Noted    Irregular heart beats 01/16/2018    SVT (supraventricular tachycardia) (HCC) 09/11/2017    SOB (shortness of breath) 08/31/2017    Paroxysmal supraventricular tachycardia ([address] 07/02/2012    Other dyspnea and respiratory abnormality 07/02/2012    WPW (Hunter-Parkinson-White syndrome) 05/31/2012    Angina at rest [facility]) 05/31/2012      None  Past Medical History:   Diagnosis Date    Angina at rest [facility])     Atrial fibrillation ([address]     WPW (Hunter-Parkinson-White syndrome)       Past Surgical History:   Procedure Laterality Date    [place] CARDIAC SURG PROCEDURE UNLIST  2014    Cardiac ablation (pm WPW)     No Known Allergies   Family History   Problem Relation Age of Onset    Arrhythmia Father     negative for cardiac disease  Social History     Socioeconomic History    Marital status: SINGLE   Tobacco Use    Smoking status: Never Smoker    Smokeless tobacco: Never Used   Vaping Use    Vaping Use: Never used   Substance and Sexual Activity    Alcohol use: Yes     Comment: occasional    Drug use: Yes     Types: Marijuana     Comment: occass    Sexual activity: Yes     Partners: Female     Birth control/protection: Condom     Current Outpatient Medications   Medication Sig    metoprolol succinate (TOPROL-XL) 25 mg XL tablet Take 0.5 Tablets by mouth daily.  flecainide (TAMBOCOR) 50 mg tablet TK 1 T PO Q 12 H     No current facility-administered medications for this visit.       Vitals:    01/04/22 1326   BP: 102/64   Pulse: 68   Resp: 18   SpO2: 99%   Weight: 236 lb 12.8 oz (107.4 kg)   Height: 6' 2\" (1.88 m)       I have reviewed the nurses notes, vitals, problem list, allergy list, medical history, family, social history and medications. Review of Symptoms:    General: Pt denies excessive weight gain or loss. Pt is able to conduct ADL's  HEENT: Denies blurred vision, headaches, hearing loss, epistaxis and difficulty swallowing. Respiratory: Denies cough, congestion, shortness of breath, CHRISTIANSON, wheezing or stridor. Cardiovascular: +palpitations, Denies precordial pain, edema or PND  Gastrointestinal: Denies poor appetite, indigestion, abdominal pain or blood in stool  Genitourinary: Denies hematuria, dysuria, increased urinary frequency  Musculoskeletal: Denies joint pain or swelling from muscles or joints  Neurologic: Denies tremor, paresthesias, headache, or sensory motor disturbance  Psychiatric: Denies confusion, insomnia, depression  Integumentray: Denies rash, itching or ulcers. Hematologic: Denies easy bruising, bleeding    Physical Exam:      General: Well developed, in no acute distress. HEENT: Eyes - PERRL, no jvd  Heart:  Normal S1/S2 negative S3 or S4. Regular, no murmur, gallop or rub. Respiratory: Clear bilaterally x 4, no wheezing or rales  Abdomen:   Soft, non-tender, bowel sounds are active. Extremities:  No edema, normal cap refill, no cyanosis. Musculoskeletal: No clubbing  Neuro: A&Ox3, speech clear, gait stable. Skin: Skin color is normal. No rashes or lesions.  Non diaphoretic, no ulcers or subcutaneous nodule  Vascular: 2+ pulses symmetric in all extremities  Psych - judgement intact and orientation is wnl     Cardiographics    Ekg: nsr  ekg in Er - pac initiated SVT    Results for orders placed or performed during the hospital encounter of 12/18/21   EKG, 12 LEAD, INITIAL   Result Value Ref Range    Ventricular Rate 128 BPM    Atrial Rate 129 BPM    QRS Duration 76 ms    Q-T Interval 326 ms    QTC Calculation (Bezet) 475 ms    Calculated R Axis 77 degrees    Calculated T Axis -30 degrees    Diagnosis       Atrial fibrillation with rapid ventricular response  Nonspecific ST and T wave abnormality  When compared with ECG of 13-SEP-2017 13:57,  Atrial fibrillation has replaced Sinus rhythm    Confirmed by Elie Gowers ([id number]) on 12/19/2021 12:56:37 PM           Lab Results   Component Value Date/Time    WBC 6.7 12/18/2021 04:59 PM    HGB 14.3 12/18/2021 04:59 PM    HCT 41.7 12/18/2021 04:59 PM    PLATELET 287 70/33/5387 04:59 PM    MCV 84.6 12/18/2021 04:59 PM      Lab Results   Component Value Date/Time    Sodium 140 12/18/2021 04:59 PM    Potassium 3.9 12/18/2021 04:59 PM    Chloride 109 (H) 12/18/2021 04:59 PM    CO2 27 12/18/2021 04:59 PM    Anion gap 4 (L) 12/18/2021 04:59 PM    Glucose 85 12/18/2021 04:59 PM    BUN 7 12/18/2021 04:59 PM    Creatinine 1.11 12/18/2021 04:59 PM    BUN/Creatinine ratio 6 (L) 12/18/2021 04:59 PM    GFR est AA >60 12/18/2021 04:59 PM    GFR est non-AA >60 12/18/2021 04:59 PM    Calcium 9.5 12/18/2021 04:59 PM    Bilirubin, total 0.9 12/18/2021 04:59 PM    Alk. phosphatase 72 12/18/2021 04:59 PM    Protein, total 7.8 12/18/2021 04:59 PM    Albumin 4.0 12/18/2021 04:59 PM    Globulin 3.8 12/18/2021 04:59 PM    A-G Ratio 1.1 12/18/2021 04:59 PM    ALT (SGPT) 35 12/18/2021 04:59 PM         Assessment:     Assessment:        ICD-10-CM ICD-9-CM    1. Paroxysmal supraventricular tachycardia (HCC)  I47.1 427.0 AMB POC EKG ROUTINE W/ 12 LEADS, INTER & REP      CBC WITH AUTOMATED DIFF      PROTHROMBIN TIME + INR      METABOLIC PANEL, COMPREHENSIVE      XR CHEST PA LAT   2.  Palpitations  R00.2 785.1 CBC WITH AUTOMATED DIFF      PROTHROMBIN TIME + INR      METABOLIC PANEL, COMPREHENSIVE      XR CHEST PA LAT     Orders Placed This Encounter    XR CHEST PA LAT     Standing Status:   Future     Standing Expiration Date:   2/4/2023     Order Specific Question:   Reason for Exam     Answer:   pre op    CBC WITH AUTOMATED DIFF     Standing Status:   Future     Standing Expiration Date:   7/4/2022    PROTHROMBIN TIME + INR     Standing Status:   Future     Standing Expiration Date:   5/5/8531    METABOLIC PANEL, COMPREHENSIVE     Standing Status:   Future     Standing Expiration Date:   7/4/2022    AMB POC EKG ROUTINE W/ 12 LEADS, INTER & REP     Order Specific Question:   Reason for Exam:     Answer:   routine        Plan:   Heriberto Castillo is a pleasant young man with a hx of WPW and sp RFA of LAP. His ekg denotes absence of WPW but his er ekg demonstrated SVT started by a pac. He is a candidate for a svt ablation. I discussed the risks/benefits/alternatives of the procedure with the patient. Risks include (but are not limited to) bleeding, heart block, infection, cva/mi/tamponade/death. The patient understands and agrees to proceed. Thank you for this interesting consultation. Thank you for allowing me to participate in Heriberto Castillo 's care.     Ashley Butler MD, [employer]

## 2022-01-04 NOTE — PROGRESS NOTES
Subjective:      Tiesha Sewell is a 28 y.o. male is here for EP consult. He is RFA of LAP in 2018 and has not been seen since. He presented to the ER with palpitations and was noted in the ER to go into SVT precipitated by a pac. He denies cp    Patient Active Problem List    Diagnosis Date Noted    Irregular heart beats 01/16/2018    SVT (supraventricular tachycardia) (HCC) 09/11/2017    SOB (shortness of breath) 08/31/2017    Paroxysmal supraventricular tachycardia ([address] 07/02/2012    Other dyspnea and respiratory abnormality 07/02/2012    WPW (Hunter-Parkinson-White syndrome) 05/31/2012    Angina at rest [facility]) 05/31/2012      None  Past Medical History:   Diagnosis Date    Angina at rest [facility])     Atrial fibrillation ([address]     WPW (Hunter-Parkinson-White syndrome)       Past Surgical History:   Procedure Laterality Date    [place] CARDIAC SURG PROCEDURE UNLIST  2014    Cardiac ablation (pm WPW)     No Known Allergies   Family History   Problem Relation Age of Onset    Arrhythmia Father     negative for cardiac disease  Social History     Socioeconomic History    Marital status: SINGLE   Tobacco Use    Smoking status: Never Smoker    Smokeless tobacco: Never Used   Vaping Use    Vaping Use: Never used   Substance and Sexual Activity    Alcohol use: Yes     Comment: occasional    Drug use: Yes     Types: Marijuana     Comment: occass    Sexual activity: Yes     Partners: Female     Birth control/protection: Condom     Current Outpatient Medications   Medication Sig    metoprolol succinate (TOPROL-XL) 25 mg XL tablet Take 0.5 Tablets by mouth daily.  flecainide (TAMBOCOR) 50 mg tablet TK 1 T PO Q 12 H     No current facility-administered medications for this visit.       Vitals:    01/04/22 1326   BP: 102/64   Pulse: 68   Resp: 18   SpO2: 99%   Weight: 236 lb 12.8 oz (107.4 kg)   Height: 6' 2\" (1.88 m)       I have reviewed the nurses notes, vitals, problem list, allergy list, medical history, family, social history and medications. Review of Symptoms:    General: Pt denies excessive weight gain or loss. Pt is able to conduct ADL's  HEENT: Denies blurred vision, headaches, hearing loss, epistaxis and difficulty swallowing. Respiratory: Denies cough, congestion, shortness of breath, CHRISTIANSON, wheezing or stridor. Cardiovascular: +palpitations, Denies precordial pain, edema or PND  Gastrointestinal: Denies poor appetite, indigestion, abdominal pain or blood in stool  Genitourinary: Denies hematuria, dysuria, increased urinary frequency  Musculoskeletal: Denies joint pain or swelling from muscles or joints  Neurologic: Denies tremor, paresthesias, headache, or sensory motor disturbance  Psychiatric: Denies confusion, insomnia, depression  Integumentray: Denies rash, itching or ulcers. Hematologic: Denies easy bruising, bleeding    Physical Exam:      General: Well developed, in no acute distress. HEENT: Eyes - PERRL, no jvd  Heart:  Normal S1/S2 negative S3 or S4. Regular, no murmur, gallop or rub. Respiratory: Clear bilaterally x 4, no wheezing or rales  Abdomen:   Soft, non-tender, bowel sounds are active. Extremities:  No edema, normal cap refill, no cyanosis. Musculoskeletal: No clubbing  Neuro: A&Ox3, speech clear, gait stable. Skin: Skin color is normal. No rashes or lesions.  Non diaphoretic, no ulcers or subcutaneous nodule  Vascular: 2+ pulses symmetric in all extremities  Psych - judgement intact and orientation is wnl     Cardiographics    Ekg: nsr  ekg in Er - pac initiated SVT    Results for orders placed or performed during the hospital encounter of 12/18/21   EKG, 12 LEAD, INITIAL   Result Value Ref Range    Ventricular Rate 128 BPM    Atrial Rate 129 BPM    QRS Duration 76 ms    Q-T Interval 326 ms    QTC Calculation (Bezet) 475 ms    Calculated R Axis 77 degrees    Calculated T Axis -30 degrees    Diagnosis       Atrial fibrillation with rapid ventricular response  Nonspecific ST and T wave abnormality  When compared with ECG of 13-SEP-2017 13:57,  Atrial fibrillation has replaced Sinus rhythm    Confirmed by Rudyus Epps ([id number]) on 12/19/2021 12:56:37 PM           Lab Results   Component Value Date/Time    WBC 6.7 12/18/2021 04:59 PM    HGB 14.3 12/18/2021 04:59 PM    HCT 41.7 12/18/2021 04:59 PM    PLATELET 335 65/15/3862 04:59 PM    MCV 84.6 12/18/2021 04:59 PM      Lab Results   Component Value Date/Time    Sodium 140 12/18/2021 04:59 PM    Potassium 3.9 12/18/2021 04:59 PM    Chloride 109 (H) 12/18/2021 04:59 PM    CO2 27 12/18/2021 04:59 PM    Anion gap 4 (L) 12/18/2021 04:59 PM    Glucose 85 12/18/2021 04:59 PM    BUN 7 12/18/2021 04:59 PM    Creatinine 1.11 12/18/2021 04:59 PM    BUN/Creatinine ratio 6 (L) 12/18/2021 04:59 PM    GFR est AA >60 12/18/2021 04:59 PM    GFR est non-AA >60 12/18/2021 04:59 PM    Calcium 9.5 12/18/2021 04:59 PM    Bilirubin, total 0.9 12/18/2021 04:59 PM    Alk. phosphatase 72 12/18/2021 04:59 PM    Protein, total 7.8 12/18/2021 04:59 PM    Albumin 4.0 12/18/2021 04:59 PM    Globulin 3.8 12/18/2021 04:59 PM    A-G Ratio 1.1 12/18/2021 04:59 PM    ALT (SGPT) 35 12/18/2021 04:59 PM         Assessment:     Assessment:        ICD-10-CM ICD-9-CM    1. Paroxysmal supraventricular tachycardia (HCC)  I47.1 427.0 AMB POC EKG ROUTINE W/ 12 LEADS, INTER & REP      CBC WITH AUTOMATED DIFF      PROTHROMBIN TIME + INR      METABOLIC PANEL, COMPREHENSIVE      XR CHEST PA LAT   2.  Palpitations  R00.2 785.1 CBC WITH AUTOMATED DIFF      PROTHROMBIN TIME + INR      METABOLIC PANEL, COMPREHENSIVE      XR CHEST PA LAT     Orders Placed This Encounter    XR CHEST PA LAT     Standing Status:   Future     Standing Expiration Date:   2/4/2023     Order Specific Question:   Reason for Exam     Answer:   pre op    CBC WITH AUTOMATED DIFF     Standing Status:   Future     Standing Expiration Date:   7/4/2022    PROTHROMBIN TIME + INR     Standing Status:   Future     Standing Expiration Date:   8/2/8195    METABOLIC PANEL, COMPREHENSIVE     Standing Status:   Future     Standing Expiration Date:   7/4/2022    AMB POC EKG ROUTINE W/ 12 LEADS, INTER & REP     Order Specific Question:   Reason for Exam:     Answer:   routine        Plan:   Lebron Mcmullen is a pleasant young man with a hx of WPW and sp RFA of LAP. His ekg denotes absence of WPW but his er ekg demonstrated SVT started by a pac. He is a candidate for a svt ablation. I discussed the risks/benefits/alternatives of the procedure with the patient. Risks include (but are not limited to) bleeding, heart block, infection, cva/mi/tamponade/death. The patient understands and agrees to proceed. Thank you for this interesting consultation. Thank you for allowing me to participate in Lebron Mcmullen 's care.     Krystal Diamond MD, Torrie Vernon unable to perform

## 2022-01-10 ENCOUNTER — HOSPITAL ENCOUNTER (OUTPATIENT)
Dept: GENERAL RADIOLOGY | Age: 32
Discharge: HOME | End: 2022-01-10
Payer: COMMERCIAL

## 2022-01-10 DIAGNOSIS — I47.1 PAROXYSMAL SUPRAVENTRICULAR TACHYCARDIA (HCC): ICD-10-CM

## 2022-01-10 DIAGNOSIS — R00.2 PALPITATIONS: ICD-10-CM

## 2022-01-10 PROCEDURE — 71046 X-RAY EXAM CHEST 2 VIEWS: CPT

## 2022-01-17 ENCOUNTER — HOSPITAL ENCOUNTER (OUTPATIENT)
Dept: PREADMISSION TESTING | Age: 32
Discharge: HOME | End: 2022-01-17
Payer: COMMERCIAL

## 2022-01-17 PROCEDURE — U0005 INFEC AGEN DETEC AMPLI PROBE: HCPCS

## 2022-01-18 LAB
SARS-COV-2, XPLCVT: NOT DETECTED
SOURCE, COVRS: NORMAL

## 2022-01-21 ENCOUNTER — ANESTHESIA (OUTPATIENT)
Dept: CARDIAC CATH/INVASIVE PROCEDURES | Age: 32
End: 2022-01-21
Payer: COMMERCIAL

## 2022-01-21 ENCOUNTER — HOSPITAL ENCOUNTER (OUTPATIENT)
Age: 32
Discharge: HOME | End: 2022-01-21
Attending: INTERNAL MEDICINE | Admitting: INTERNAL MEDICINE
Payer: COMMERCIAL

## 2022-01-21 ENCOUNTER — ANESTHESIA EVENT (OUTPATIENT)
Dept: CARDIAC CATH/INVASIVE PROCEDURES | Age: 32
End: 2022-01-21
Payer: COMMERCIAL

## 2022-01-21 VITALS
HEIGHT: 74 IN | SYSTOLIC BLOOD PRESSURE: 140 MMHG | TEMPERATURE: 98 F | OXYGEN SATURATION: 98 % | DIASTOLIC BLOOD PRESSURE: 80 MMHG | HEART RATE: 64 BPM | RESPIRATION RATE: 20 BRPM | WEIGHT: 235 LBS | BODY MASS INDEX: 30.16 KG/M2

## 2022-01-21 DIAGNOSIS — I47.1 ATRIOVENTRICULAR RECIPROCATING TACHYCARDIA (HCC): Chronic | ICD-10-CM

## 2022-01-21 DIAGNOSIS — I47.1 SVT (SUPRAVENTRICULAR TACHYCARDIA) (HCC): ICD-10-CM

## 2022-01-21 DIAGNOSIS — I47.1 AVNRT (AV NODAL RE-ENTRY TACHYCARDIA) (HCC): ICD-10-CM

## 2022-01-21 PROCEDURE — 77030035291 HC TBNG PMP SMARTABLATE J&J -B: Performed by: INTERNAL MEDICINE

## 2022-01-21 PROCEDURE — 77030041009 HC ADTR CBL EP DX J&J -D: Performed by: INTERNAL MEDICINE

## 2022-01-21 PROCEDURE — C1730 CATH, EP, 19 OR FEW ELECT: HCPCS | Performed by: INTERNAL MEDICINE

## 2022-01-21 PROCEDURE — 93623 PRGRMD STIMJ&PACG IV RX NFS: CPT | Performed by: INTERNAL MEDICINE

## 2022-01-21 PROCEDURE — 74011000250 HC RX REV CODE- 250: Performed by: INTERNAL MEDICINE

## 2022-01-21 PROCEDURE — 74011000258 HC RX REV CODE- 258: Performed by: NURSE ANESTHETIST, CERTIFIED REGISTERED

## 2022-01-21 PROCEDURE — C1894 INTRO/SHEATH, NON-LASER: HCPCS | Performed by: INTERNAL MEDICINE

## 2022-01-21 PROCEDURE — 76060000034 HC ANESTHESIA 1.5 TO 2 HR: Performed by: INTERNAL MEDICINE

## 2022-01-21 PROCEDURE — 77030013797 HC KT TRNSDUC PRSSR EDWD -A: Performed by: INTERNAL MEDICINE

## 2022-01-21 PROCEDURE — 93653 COMPRE EP EVAL TX SVT: CPT | Performed by: INTERNAL MEDICINE

## 2022-01-21 PROCEDURE — 77010033678 HC OXYGEN DAILY

## 2022-01-21 PROCEDURE — 77030010869 HC CBL EP ABL STJU -B: Performed by: INTERNAL MEDICINE

## 2022-01-21 PROCEDURE — 77030027107 HC PTCH EXT REF CARTO3 J&J -F: Performed by: INTERNAL MEDICINE

## 2022-01-21 PROCEDURE — 77030026438 HC STYL ET INTUB CARD -A: Performed by: NURSE ANESTHETIST, CERTIFIED REGISTERED

## 2022-01-21 PROCEDURE — 93621 COMP EP EVL L PAC&REC C SINS: CPT | Performed by: INTERNAL MEDICINE

## 2022-01-21 PROCEDURE — 77030015398 HC CBL EP EXT STJU -C: Performed by: INTERNAL MEDICINE

## 2022-01-21 PROCEDURE — C1732 CATH, EP, DIAG/ABL, 3D/VECT: HCPCS | Performed by: INTERNAL MEDICINE

## 2022-01-21 PROCEDURE — 77030016894 HC CBL EP DX CATH3 STJU -B: Performed by: INTERNAL MEDICINE

## 2022-01-21 PROCEDURE — 93655 ICAR CATH ABLTJ DSCRT ARRHYT: CPT | Performed by: INTERNAL MEDICINE

## 2022-01-21 PROCEDURE — 74011000250 HC RX REV CODE- 250: Performed by: NURSE ANESTHETIST, CERTIFIED REGISTERED

## 2022-01-21 PROCEDURE — 76210000006 HC OR PH I REC 0.5 TO 1 HR

## 2022-01-21 PROCEDURE — C1893 INTRO/SHEATH, FIXED,NON-PEEL: HCPCS | Performed by: INTERNAL MEDICINE

## 2022-01-21 PROCEDURE — 93613 INTRACARDIAC EPHYS 3D MAPG: CPT | Performed by: INTERNAL MEDICINE

## 2022-01-21 PROCEDURE — 77030010880 HC CBL EP SUPRME STJU -C: Performed by: INTERNAL MEDICINE

## 2022-01-21 PROCEDURE — 77030008684 HC TU ET CUF COVD -B: Performed by: NURSE ANESTHETIST, CERTIFIED REGISTERED

## 2022-01-21 PROCEDURE — 2709999900 HC NON-CHARGEABLE SUPPLY: Performed by: INTERNAL MEDICINE

## 2022-01-21 PROCEDURE — 74011250636 HC RX REV CODE- 250/636: Performed by: NURSE ANESTHETIST, CERTIFIED REGISTERED

## 2022-01-21 PROCEDURE — 74011250636 HC RX REV CODE- 250/636: Performed by: INTERNAL MEDICINE

## 2022-01-21 RX ORDER — SODIUM CHLORIDE 9 MG/ML
50 INJECTION, SOLUTION INTRAVENOUS CONTINUOUS
Status: CANCELLED | OUTPATIENT
Start: 2022-01-21 | End: 2022-01-22

## 2022-01-21 RX ORDER — SODIUM CHLORIDE 0.9 % (FLUSH) 0.9 %
5-40 SYRINGE (ML) INJECTION EVERY 8 HOURS
Status: DISCONTINUED | OUTPATIENT
Start: 2022-01-21 | End: 2022-01-21 | Stop reason: HOSPADM

## 2022-01-21 RX ORDER — SODIUM CHLORIDE 0.9 % (FLUSH) 0.9 %
5-40 SYRINGE (ML) INJECTION AS NEEDED
Status: DISCONTINUED | OUTPATIENT
Start: 2022-01-21 | End: 2022-01-21 | Stop reason: HOSPADM

## 2022-01-21 RX ORDER — MORPHINE SULFATE 2 MG/ML
2 INJECTION, SOLUTION INTRAMUSCULAR; INTRAVENOUS
Status: DISCONTINUED | OUTPATIENT
Start: 2022-01-21 | End: 2022-01-21 | Stop reason: HOSPADM

## 2022-01-21 RX ORDER — HEPARIN SODIUM 200 [USP'U]/100ML
INJECTION, SOLUTION INTRAVENOUS
Status: COMPLETED | OUTPATIENT
Start: 2022-01-21 | End: 2022-01-21

## 2022-01-21 RX ORDER — FENTANYL CITRATE 50 UG/ML
INJECTION, SOLUTION INTRAMUSCULAR; INTRAVENOUS AS NEEDED
Status: DISCONTINUED | OUTPATIENT
Start: 2022-01-21 | End: 2022-01-21 | Stop reason: HOSPADM

## 2022-01-21 RX ORDER — SODIUM CHLORIDE 0.9 % (FLUSH) 0.9 %
5-40 SYRINGE (ML) INJECTION EVERY 8 HOURS
Status: CANCELLED | OUTPATIENT
Start: 2022-01-21

## 2022-01-21 RX ORDER — FENTANYL CITRATE 50 UG/ML
25 INJECTION, SOLUTION INTRAMUSCULAR; INTRAVENOUS
Status: DISCONTINUED | OUTPATIENT
Start: 2022-01-21 | End: 2022-01-21 | Stop reason: HOSPADM

## 2022-01-21 RX ORDER — MIDAZOLAM HYDROCHLORIDE 1 MG/ML
INJECTION, SOLUTION INTRAMUSCULAR; INTRAVENOUS AS NEEDED
Status: DISCONTINUED | OUTPATIENT
Start: 2022-01-21 | End: 2022-01-21 | Stop reason: HOSPADM

## 2022-01-21 RX ORDER — PHENYLEPHRINE HCL IN 0.9% NACL 0.4MG/10ML
SYRINGE (ML) INTRAVENOUS AS NEEDED
Status: DISCONTINUED | OUTPATIENT
Start: 2022-01-21 | End: 2022-01-21 | Stop reason: HOSPADM

## 2022-01-21 RX ORDER — HYDROCODONE BITARTRATE AND ACETAMINOPHEN 5; 325 MG/1; MG/1
1 TABLET ORAL
Status: DISCONTINUED | OUTPATIENT
Start: 2022-01-21 | End: 2022-01-21 | Stop reason: HOSPADM

## 2022-01-21 RX ORDER — MIDAZOLAM HYDROCHLORIDE 1 MG/ML
1 INJECTION, SOLUTION INTRAMUSCULAR; INTRAVENOUS AS NEEDED
Status: CANCELLED | OUTPATIENT
Start: 2022-01-21

## 2022-01-21 RX ORDER — SODIUM CHLORIDE 9 MG/ML
INJECTION, SOLUTION INTRAVENOUS
Status: DISCONTINUED | OUTPATIENT
Start: 2022-01-21 | End: 2022-01-21 | Stop reason: HOSPADM

## 2022-01-21 RX ORDER — PROPOFOL 10 MG/ML
INJECTION, EMULSION INTRAVENOUS AS NEEDED
Status: DISCONTINUED | OUTPATIENT
Start: 2022-01-21 | End: 2022-01-21 | Stop reason: HOSPADM

## 2022-01-21 RX ORDER — DEXAMETHASONE SODIUM PHOSPHATE 4 MG/ML
INJECTION, SOLUTION INTRA-ARTICULAR; INTRALESIONAL; INTRAMUSCULAR; INTRAVENOUS; SOFT TISSUE AS NEEDED
Status: DISCONTINUED | OUTPATIENT
Start: 2022-01-21 | End: 2022-01-21 | Stop reason: HOSPADM

## 2022-01-21 RX ORDER — SODIUM CHLORIDE 0.9 % (FLUSH) 0.9 %
5-40 SYRINGE (ML) INJECTION AS NEEDED
Status: CANCELLED | OUTPATIENT
Start: 2022-01-21

## 2022-01-21 RX ORDER — SODIUM CHLORIDE 9 MG/ML
50 INJECTION, SOLUTION INTRAVENOUS CONTINUOUS
Status: DISCONTINUED | OUTPATIENT
Start: 2022-01-21 | End: 2022-01-21 | Stop reason: HOSPADM

## 2022-01-21 RX ORDER — HYDROMORPHONE HYDROCHLORIDE 1 MG/ML
0.2 INJECTION, SOLUTION INTRAMUSCULAR; INTRAVENOUS; SUBCUTANEOUS
Status: DISCONTINUED | OUTPATIENT
Start: 2022-01-21 | End: 2022-01-21 | Stop reason: HOSPADM

## 2022-01-21 RX ORDER — MIDAZOLAM HYDROCHLORIDE 1 MG/ML
0.5 INJECTION, SOLUTION INTRAMUSCULAR; INTRAVENOUS
Status: DISCONTINUED | OUTPATIENT
Start: 2022-01-21 | End: 2022-01-21 | Stop reason: HOSPADM

## 2022-01-21 RX ORDER — SODIUM CHLORIDE, SODIUM LACTATE, POTASSIUM CHLORIDE, CALCIUM CHLORIDE 600; 310; 30; 20 MG/100ML; MG/100ML; MG/100ML; MG/100ML
75 INJECTION, SOLUTION INTRAVENOUS CONTINUOUS
Status: DISCONTINUED | OUTPATIENT
Start: 2022-01-21 | End: 2022-01-21 | Stop reason: HOSPADM

## 2022-01-21 RX ORDER — OXYCODONE AND ACETAMINOPHEN 5; 325 MG/1; MG/1
1 TABLET ORAL AS NEEDED
Status: DISCONTINUED | OUTPATIENT
Start: 2022-01-21 | End: 2022-01-21 | Stop reason: HOSPADM

## 2022-01-21 RX ORDER — DIPHENHYDRAMINE HYDROCHLORIDE 50 MG/ML
12.5 INJECTION, SOLUTION INTRAMUSCULAR; INTRAVENOUS AS NEEDED
Status: DISCONTINUED | OUTPATIENT
Start: 2022-01-21 | End: 2022-01-21 | Stop reason: HOSPADM

## 2022-01-21 RX ORDER — SODIUM CHLORIDE, SODIUM LACTATE, POTASSIUM CHLORIDE, CALCIUM CHLORIDE 600; 310; 30; 20 MG/100ML; MG/100ML; MG/100ML; MG/100ML
75 INJECTION, SOLUTION INTRAVENOUS CONTINUOUS
Status: CANCELLED | OUTPATIENT
Start: 2022-01-21 | End: 2022-01-22

## 2022-01-21 RX ORDER — FENTANYL CITRATE 50 UG/ML
50 INJECTION, SOLUTION INTRAMUSCULAR; INTRAVENOUS AS NEEDED
Status: CANCELLED | OUTPATIENT
Start: 2022-01-21

## 2022-01-21 RX ORDER — ACETAMINOPHEN 325 MG/1
650 TABLET ORAL
Status: DISCONTINUED | OUTPATIENT
Start: 2022-01-21 | End: 2022-01-21 | Stop reason: HOSPADM

## 2022-01-21 RX ORDER — ROCURONIUM BROMIDE 10 MG/ML
INJECTION, SOLUTION INTRAVENOUS AS NEEDED
Status: DISCONTINUED | OUTPATIENT
Start: 2022-01-21 | End: 2022-01-21 | Stop reason: HOSPADM

## 2022-01-21 RX ORDER — ONDANSETRON 2 MG/ML
INJECTION INTRAMUSCULAR; INTRAVENOUS AS NEEDED
Status: DISCONTINUED | OUTPATIENT
Start: 2022-01-21 | End: 2022-01-21 | Stop reason: HOSPADM

## 2022-01-21 RX ORDER — LIDOCAINE HYDROCHLORIDE 10 MG/ML
0.1 INJECTION, SOLUTION EPIDURAL; INFILTRATION; INTRACAUDAL; PERINEURAL AS NEEDED
Status: CANCELLED | OUTPATIENT
Start: 2022-01-21

## 2022-01-21 RX ORDER — SUCCINYLCHOLINE CHLORIDE 20 MG/ML
INJECTION INTRAMUSCULAR; INTRAVENOUS AS NEEDED
Status: DISCONTINUED | OUTPATIENT
Start: 2022-01-21 | End: 2022-01-21 | Stop reason: HOSPADM

## 2022-01-21 RX ORDER — LIDOCAINE HYDROCHLORIDE 20 MG/ML
INJECTION, SOLUTION EPIDURAL; INFILTRATION; INTRACAUDAL; PERINEURAL AS NEEDED
Status: DISCONTINUED | OUTPATIENT
Start: 2022-01-21 | End: 2022-01-21 | Stop reason: HOSPADM

## 2022-01-21 RX ADMIN — PROPOFOL 20 MG: 10 INJECTION, EMULSION INTRAVENOUS at 08:08

## 2022-01-21 RX ADMIN — LIDOCAINE HYDROCHLORIDE 80 MG: 20 INJECTION, SOLUTION INTRAVENOUS at 08:06

## 2022-01-21 RX ADMIN — DEXAMETHASONE SODIUM PHOSPHATE 4 MG: 4 INJECTION, SOLUTION INTRAMUSCULAR; INTRAVENOUS at 08:12

## 2022-01-21 RX ADMIN — PROPOFOL 200 MG: 10 INJECTION, EMULSION INTRAVENOUS at 08:07

## 2022-01-21 RX ADMIN — SODIUM CHLORIDE: 9 INJECTION, SOLUTION INTRAVENOUS at 08:02

## 2022-01-21 RX ADMIN — MIDAZOLAM HYDROCHLORIDE 2 MG: 1 INJECTION, SOLUTION INTRAMUSCULAR; INTRAVENOUS at 08:02

## 2022-01-21 RX ADMIN — DEXMEDETOMIDINE HYDROCHLORIDE 10 MCG: 100 INJECTION, SOLUTION, CONCENTRATE INTRAVENOUS at 08:37

## 2022-01-21 RX ADMIN — PROPOFOL 50 MG: 10 INJECTION, EMULSION INTRAVENOUS at 08:37

## 2022-01-21 RX ADMIN — FENTANYL CITRATE 50 MCG: 50 INJECTION, SOLUTION INTRAMUSCULAR; INTRAVENOUS at 08:06

## 2022-01-21 RX ADMIN — DEXMEDETOMIDINE HYDROCHLORIDE 10 MCG: 100 INJECTION, SOLUTION, CONCENTRATE INTRAVENOUS at 09:35

## 2022-01-21 RX ADMIN — SUCCINYLCHOLINE CHLORIDE 180 MG: 20 INJECTION, SOLUTION INTRAMUSCULAR; INTRAVENOUS at 08:08

## 2022-01-21 RX ADMIN — ROCURONIUM BROMIDE 5 MG: 10 INJECTION INTRAVENOUS at 08:07

## 2022-01-21 RX ADMIN — Medication 120 MCG: at 09:05

## 2022-01-21 RX ADMIN — FENTANYL CITRATE 50 MCG: 50 INJECTION, SOLUTION INTRAMUSCULAR; INTRAVENOUS at 08:28

## 2022-01-21 RX ADMIN — ONDANSETRON HYDROCHLORIDE 4 MG: 2 INJECTION, SOLUTION INTRAMUSCULAR; INTRAVENOUS at 08:42

## 2022-01-21 NOTE — PERIOP NOTES
TRANSFER - OUT REPORT:    Verbal report given to Cath lab, RN(name) on Kasie Serum  being transferred to Cath Lab recovery(unit) for routine post - op       Report consisted of patients Situation, Background, Assessment and   Recommendations(SBAR). Information from the following report(s) SBAR, Kardex, ED Summary, OR Summary, Procedure Summary, Intake/Output, MAR, Accordion, Recent Results, Med Rec Status, Cardiac Rhythm NSR, Alarm Parameters , Pre Procedure Checklist, Procedure Verification and Quality Measures was reviewed with the receiving nurse. Opportunity for questions and clarification was provided.       Patient transported with:   Monitor  Registered Nurse

## 2022-01-21 NOTE — DISCHARGE INSTRUCTIONS
[address] [place] [address]  [phone]        SVT ABLATION DISCHARGE INSTRUCTIONS    Patient ID:  Belkys Almeida  [id number]  89 y.o.  1990    Admit Date: 1/21/2022    Discharge Date: 1/21/2022     Admitting Physician: Kelin Ibrahim MD     Discharge Physician: Kelin Ibrahim MD    Admission Diagnoses:   SVT (supraventricular tachycardia) ([address] [I47.1]  Atrioventricular reciprocating tachycardia ([address] [I47.1]    Discharge Diagnoses: Active Problems:    SVT (supraventricular tachycardia) ([facility]) (9/11/2017)      Atrioventricular reciprocating tachycardia ([address] (1/21/2022)        Discharge Condition: Good    Cardiology Procedures this Admission:  AVRT and AVNRT ablation    Disposition: home    Reference discharge instructions provided by nursing for diet and activity. Follow-up with Dr Samira Puri or his Nurse Practitioners in 1-2 weeks. Call [phone] to make an appointment. Signed:  Kelin Ibrahim MD  1/21/2022  9:35 AM    S/P SVT ABLATION DISCHARGE INSTRUCTIONS    It is normal to feel tired the first couple days. Take it easy and follow the physicians instructions. CHECK THE CATHETER INSERTION SITE DAILY:  You may shower 24 hours after the procedure, remove the bandage during showering. Wash with soap and water and pat dry. Gentle cleaning of the site with soap and water is sufficient, cover with a dry clean dressing or bandage. Do not apply creams or powders to the area. Do not sit in a bathtub or pool of water for 7 days or until wound has completely healed. Temporary bruising and discomfort is normal and may last a few weeks. You may have a  formation of a small lump at the site which may last up to 6 weeks. CALL THE PHYSICIAN:  If the site becomes red, swollen or feels warm to the touch  If there is bleeding or drainage or if there is unusual pain at the groin or down the leg.   If there is any bleeding, lie down, apply pressure or have someone apply pressure with a clean cloth until the bleeding stops. If the bleeding continues, call 911 to be transported to the hospital.  DO NOT DRIVE YOURSELF, Jennifer Javed6. Activity:      For the first 24-48 hours or as instructed by the physician:  No lifting, pushing or pulling over 5 pounds and no straining the insertion site. Do not lift grocery bags or the garbage can, do not run the vacuum  or  for 7 days. Start with short walks as in the hospital and gradually increase as tolerated each day. It is recommended to walk 30 minutes 5-7 days per week. Follow your physicians instructions on activity. Avoid walking outside in extremes of heat or cold. Walk inside when it is cold and windy or hot and humid. Things to keep in mind:  No driving for at least 5 days or as designated by your physician. Limit the number of times you go up and down the stairs  Take rests and pace yourself with activity. Be careful and do not strain with bowel movements. Medications: Take all medications as prescribed  Call your physician if you have any questions  Keep an updated list of your medications with you at all times and give a list to your physician and pharmacist    Signs and Symptoms:  Be cautious of symptoms of angina or recurrent symptoms such as chest discomfort, unusual shortness of breath or fatigue, palpitations. After Care: Follow up with your physician as instructed. Follow a heart healthy diet with proper portion control, daily stress management, daily exercise, blood pressure and cholesterol control , and smoking cessation.

## 2022-01-21 NOTE — INTERVAL H&P NOTE
Update History & Physical    The Patient's History and Physical of January 4, 2022 was reviewed with the patient and I examined the patient. There was no change. The surgical site was confirmed by the patient and me. Plan:  The risk, benefits, expected outcome, and alternative to the recommended procedure have been discussed with the patient. Patient understands and wants to proceed with the procedure.     Electronically signed by Kelin Ibrahim MD on 1/21/2022 at 8:23 AM

## 2022-01-21 NOTE — PROGRESS NOTES
Cardiac Cath Lab Recovery Arrival Note:      Phyllis Judge arrived to Cardiac Cath Lab, Recovery Area. Staff introduced to patient. Patient identifiers verified with NAME and DATE OF BIRTH. Procedure verified with patient. Consent forms reviewed and signed by patient or authorized representative and verified. Allergies verified. Patient and family oriented to department. Patient and family informed of procedure and plan of care. Questions answered with review. Patient prepped for procedure, per orders from physician, prior to arrival.    Patient on cardiac monitor, non-invasive blood pressure, SPO2 monitor. On room air. Patient is A&Ox 3. Patient reports no c/o. Patient in stretcher, in low position, with side rails up, call bell within reach, patient instructed to call if assistance as needed. Patient prep in: [address], [place] [address]. Patient family has pager # none  Family in: [facility] waiting area.    Prep by: Teresa Burden RN and Keyana Bhatt RN

## 2022-01-21 NOTE — PROGRESS NOTES
Ambulate with pt in pace to BR. Gait steady. Pt denies c/o. Right groin dressing dry and intact. DC instructions reviewed with pt and mother. Both verbalize understanding. SL dcd without difficulty. Pt wheeled to front door to be driven home by mother.

## 2022-01-21 NOTE — ANESTHESIA POSTPROCEDURE EVALUATION
Procedure(s):  ABLATION SVT  LT ATRIAL PACE & RECORD DURING EP STUDY  ABLATION SVT/VT ADD ON  DRUG STIMULATION. general    Anesthesia Post Evaluation      Multimodal analgesia: multimodal analgesia used between 6 hours prior to anesthesia start to PACU discharge  Patient location during evaluation: PACU  Patient participation: complete - patient participated  Level of consciousness: awake and alert  Pain management: adequate  Airway patency: patent  Anesthetic complications: no  Cardiovascular status: acceptable  Respiratory status: acceptable  Hydration status: acceptable  Comments: Seen, no complaints   Post anesthesia nausea and vomiting:  none  Final Post Anesthesia Temperature Assessment:  Normothermia (36.0-37.5 degrees C)      INITIAL Post-op Vital signs:   Vitals Value Taken Time   /75 01/21/22 1030   Temp 36.1 °C (96.9 °F) 01/21/22 0953   Pulse 71 01/21/22 1038   Resp 13 01/21/22 1038   SpO2 100 % 01/21/22 1038   Vitals shown include unvalidated device data.

## 2022-01-21 NOTE — PROGRESS NOTES
Primary Nurse Loraine Tolbert RN and Katharina Arnold RN performed a dual skin assessment on this patient No impairment noted  Alex score is 23

## 2022-01-21 NOTE — ANESTHESIA PREPROCEDURE EVALUATION
Anesthetic History   No history of anesthetic complications            Review of Systems / Medical History  Patient summary reviewed, nursing notes reviewed and pertinent labs reviewed    Pulmonary          Shortness of breath and smoker         Neuro/Psych   Within defined limits           Cardiovascular        Angina    Dysrhythmias : atrial fibrillation and SVT      Exercise tolerance: >4 METS  Comments: Hx WPW   GI/Hepatic/Renal  Within defined limits              Endo/Other  Within defined limits           Other Findings              Physical Exam    Airway  Mallampati: II  TM Distance: 4 - 6 cm  Neck ROM: normal range of motion   Mouth opening: Normal     Cardiovascular  Regular rate and rhythm,  S1 and S2 normal,  no murmur, click, rub, or gallop  Rhythm: regular  Rate: normal         Dental  No notable dental hx       Pulmonary  Breath sounds clear to auscultation               Abdominal  GI exam deferred       Other Findings            Anesthetic Plan    ASA: 2  Anesthesia type: general          Induction: Intravenous  Anesthetic plan and risks discussed with: Patient

## 2022-01-21 NOTE — Clinical Note
TRANSFER - IN REPORT:     Verbal report received from: recovery. Report consisted of patient's Situation, Background, Assessment and   Recommendations(SBAR). Opportunity for questions and clarification was provided. Assessment completed upon patient's arrival to unit and care assumed. Patient transported with a Registered Nurse and [address] [place] [address] / [facility] [place].

## 2022-01-21 NOTE — PROGRESS NOTES
TRANSFER - IN REPORT:    Verbal report received from [place], RN(name) on [place]  being received from [employer]) for routine progression of care      Report consisted of patients Situation, Background, Assessment and   Recommendations(SBAR). Information from the following report(s) Procedure Summary and MAR was reviewed with the receiving nurse. Opportunity for questions and clarification was provided. Assessment completed upon patients arrival to unit and care assumed.

## 2022-01-21 NOTE — PERIOP NOTES
Handoff Report from Operating Room to PACU    Report received from Shashi Collier [facility] and BLAINE Francois CRNA regarding Geraldean Bloodgood. Surgeon(s):  Anesthesia, Case  And Procedure(s) (LRB):  SPECIAL PROCEDURE OUTSIDE OF OR (N/A)  confirmed   with allergies and dressings discussed. Anesthesia type, drugs, patient history, complications, estimated blood loss, vital signs, intake and output, and last pain medication, lines, reversal medications and temperature were reviewed.

## 2022-01-28 NOTE — DISCHARGE INSTRUCTIONS
[address] [address] [address] [facility]  [phone]        ABLATION DISCHARGE INSTRUCTIONS    Patient ID:  Shaun Mera  [id number]  27 y.o.  1990    Admit Date: 9/11/2017    Discharge Date: 9/11/2017     Admitting Physician: Tomasz Abarca MD     Discharge Physician: Tomasz Abarca MD    Admission Diagnoses:   SVT  SVT (supraventricular tachycardia) [facility])    Discharge Diagnoses: Active Problems:    SVT (supraventricular tachycardia) ([address] (9/11/2017)        Discharge Condition: Good    Cardiology Procedures this Admission:  AVRT ablation    Disposition: home    Reference discharge instructions provided by nursing for diet and activity. Follow-up with Dr Katiuska Gurrola in one week. Call [phone] to make an appointment. Signed:  Tomasz Abarca MD  9/11/2017  2:20 PM    S/P ABLATION DISCHARGE INSTRUCTIONS    It is normal to feel tired the first couple days. Take it easy and follow the physicians instructions. CHECK THE CATHETER INSERTION SITE DAILY:  You may shower 24 hours after the procedure, remove the bandage during showering. Wash with soap and water and pat dry. Gentle cleaning of the site with soap and water is sufficient, cover with a dry clean dressing or bandage. Do not apply creams or powders to the area. Do not sit in a bathtub or pool of water for 7 days or until wound has completely healed. Temporary bruising and discomfort is normal and may last a few weeks. You may have a  formation of a small lump at the site which may last up to 6 weeks. CALL THE PHYSICIAN:  If the site becomes red, swollen or feels warm to the touch  If there is bleeding or drainage or if there is unusual pain at the groin or down the leg. If there is any bleeding, lie down, apply pressure or have someone apply pressure with a clean cloth until the bleeding stops.   If the bleeding continues, call [phone] to be transported to the hospital.  DO NOT [address] [facility] ANYONE ELSE DRIVE YOU - CALL [phone]. Activity:      For the first 24-48 hours or as instructed by the physician:  No lifting, pushing or pulling over 5 pounds and no straining the insertion site. Do not life grocery bags or the garbage can, do not run the vacuum  or  for 7 days. Start with short walks as in the hospital and gradually increase as tolerated each day. It is recommended to walk 30 minutes 5-7 days per week. Follow your physicians instructions on activity. Avoid walking outside in extremes of heat or cold. Walk inside when it is cold and windy or hot and humid. Things to keep in mind:  No driving for at least 5 days, or as designated by your physician. Limit the number of times you go up and down the stairs  Take rests and pace yourself with activity. Be careful and do not strain with bowel movements. Medications: Take all medications as prescribed  Call your physician if you have any questions  Keep an updated list of your medications with you at all times and give a list to your physician and pharmacist    Signs and Symptoms:  Be cautious of symptoms of angina or recurrent symptoms such as chest discomfort, unusual shortness of breath or fatigue, palpitations. After Care: Follow up with your physician as instructed. Follow a heart healthy diet with proper portion control, daily stress management, daily exercise, blood pressure and cholesterol control , and smoking cessation. normal affect/normal behavior

## 2022-02-03 ENCOUNTER — OFFICE VISIT (OUTPATIENT)
Dept: CARDIOLOGY CLINIC | Age: 32
End: 2022-02-03
Payer: COMMERCIAL

## 2022-02-03 VITALS
WEIGHT: 240.4 LBS | DIASTOLIC BLOOD PRESSURE: 64 MMHG | HEIGHT: 74 IN | SYSTOLIC BLOOD PRESSURE: 104 MMHG | OXYGEN SATURATION: 98 % | HEART RATE: 70 BPM | RESPIRATION RATE: 15 BRPM | BODY MASS INDEX: 30.85 KG/M2

## 2022-02-03 DIAGNOSIS — I45.6 WPW (WOLFF-PARKINSON-WHITE SYNDROME): ICD-10-CM

## 2022-02-03 DIAGNOSIS — I47.1 ATRIOVENTRICULAR RECIPROCATING TACHYCARDIA (HCC): ICD-10-CM

## 2022-02-03 DIAGNOSIS — I47.1 PAROXYSMAL SUPRAVENTRICULAR TACHYCARDIA (HCC): Primary | ICD-10-CM

## 2022-02-03 DIAGNOSIS — I47.1 SVT (SUPRAVENTRICULAR TACHYCARDIA) (HCC): ICD-10-CM

## 2022-02-03 DIAGNOSIS — R00.2 PALPITATIONS: ICD-10-CM

## 2022-02-03 PROCEDURE — 93000 ELECTROCARDIOGRAM COMPLETE: CPT | Performed by: INTERNAL MEDICINE

## 2022-02-03 PROCEDURE — 99213 OFFICE O/P EST LOW 20 MIN: CPT | Performed by: INTERNAL MEDICINE

## 2022-02-03 NOTE — PROGRESS NOTES
Subjective:      Nguyen Brennan is a 28 y.o. male is here for EP consult. The patient denies chest pain/ shortness of breath, orthopnea, PND, LE edema, palpitations, syncope, presyncope or fatigue. Patient Active Problem List    Diagnosis Date Noted    Atrioventricular reciprocating tachycardia ([address] 01/21/2022    Irregular heart beats 01/16/2018    [facility] 09/11/2017    SOB (shortness of breath) 08/31/2017    Paroxysmal supraventricular tachycardia ([address] 07/02/2012    Other dyspnea and respiratory abnormality 07/02/2012    WPW (Hunter-Parkinson-White syndrome) 05/31/2012    Angina at rest [facility]) 05/31/2012      None  Past Medical History:   Diagnosis Date    Angina at rest [facility])     [facility]     WPW (Hunter-Parkinson-White syndrome)       Past Surgical History:   Procedure Laterality Date    [place] CARDIAC SURG PROCEDURE UNLIST  2014    Cardiac ablation ([facility] WPW)     No Known Allergies   Family History   Problem Relation Age of Onset    Arrhythmia Father     negative for cardiac disease  Social History     Socioeconomic History    Marital status: SINGLE   Tobacco Use    Smoking status: Never Smoker    Smokeless tobacco: Never Used   Vaping Use    Vaping Use: Never used   Substance and Sexual Activity    Alcohol use: Yes     Comment: occasional    Drug use: Yes     Types: Marijuana     Comment: occass    Sexual activity: Yes     Partners: Female     Birth control/protection: Condom     No current outpatient medications on file. No current facility-administered medications for this visit. Vitals:    02/03/22 0858   BP: 104/64   Pulse: 70   Resp: 15   SpO2: 98%   Weight: 240 lb 6.4 oz (109 kg)   Height: 6' 2\" (1.88 m)       I have reviewed the nurses notes, vitals, problem list, allergy list, medical history, family, social history and medications. Review of Symptoms:    General: Pt denies excessive weight gain or loss.  Pt is able to conduct ADL's  HEENT: Denies blurred vision, headaches, hearing loss, epistaxis and difficulty swallowing. Respiratory: Denies cough, congestion, shortness of breath, CHRISTIANSON, wheezing or stridor. Cardiovascular: Denies precordial pain, palpitations, edema or PND  Gastrointestinal: Denies poor appetite, indigestion, abdominal pain or blood in stool  Genitourinary: Denies hematuria, dysuria, increased urinary frequency  Musculoskeletal: Denies joint pain or swelling from muscles or joints  Neurologic: Denies tremor, paresthesias, headache, or sensory motor disturbance  Psychiatric: Denies confusion, insomnia, depression  Integumentray: Denies rash, itching or ulcers. Hematologic: Denies easy bruising, bleeding    Physical Exam:      General: Well developed, in no acute distress. HEENT: Eyes - PERRL, no jvd  Heart:  Normal S1/S2 negative S3 or S4. Regular, no murmur, gallop or rub. Respiratory: Clear bilaterally x 4, no wheezing or rales  Abdomen:   Soft, non-tender, bowel sounds are active. Extremities:  No edema, normal cap refill, no cyanosis. Musculoskeletal: No clubbing  Neuro: A&Ox3, speech clear, gait stable. Skin: Skin color is normal. No rashes or lesions.  Non diaphoretic, no ulcers or subcutaneous nodule  Vascular: 2+ pulses symmetric in all extremities  Psych - judgement intact and orientation is wnl     Cardiographics    Ekg: nsr    Results for orders placed or performed during the hospital encounter of 12/18/21   EKG, 12 LEAD, INITIAL   Result Value Ref Range    Ventricular Rate 128 BPM    Atrial Rate 129 BPM    QRS Duration 76 ms    Q-T Interval 326 ms    QTC Calculation (Bezet) 475 ms    Calculated R Axis 77 degrees    Calculated T Axis -30 degrees    Diagnosis       Atrial fibrillation with rapid ventricular response  Nonspecific ST and T wave abnormality  When compared with ECG of 13-SEP-2017 13:57,  Atrial fibrillation has replaced Sinus rhythm    Confirmed by Ralf Bermudez ([id number]) on 12/19/2021 12:56:37 PM           Lab Results   Component Value Date/Time    WBC 6.8 01/10/2022 02:40 PM    HGB 13.8 01/10/2022 02:40 PM    HCT 40.6 01/10/2022 02:40 PM    PLATELET 337 80/38/1340 02:40 PM    MCV 86.8 01/10/2022 02:40 PM      Lab Results   Component Value Date/Time    Sodium 139 01/10/2022 02:40 PM    Potassium 4.0 01/10/2022 02:40 PM    Chloride 107 01/10/2022 02:40 PM    CO2 25 01/10/2022 02:40 PM    Anion gap 7 01/10/2022 02:40 PM    Glucose 112 (H) 01/10/2022 02:40 PM    BUN 11 01/10/2022 02:40 PM    Creatinine 1.10 01/10/2022 02:40 PM    BUN/Creatinine ratio 10 (L) 01/10/2022 02:40 PM    GFR est AA >60 01/10/2022 02:40 PM    GFR est non-AA >60 01/10/2022 02:40 PM    Calcium 9.4 01/10/2022 02:40 PM    Bilirubin, total 1.3 (H) 01/10/2022 02:40 PM    Alk. phosphatase 79 01/10/2022 02:40 PM    Protein, total 7.4 01/10/2022 02:40 PM    Albumin 4.1 01/10/2022 02:40 PM    Globulin 3.3 01/10/2022 02:40 PM    A-G Ratio 1.2 01/10/2022 02:40 PM    ALT (SGPT) 34 01/10/2022 02:40 PM         Assessment:     Assessment:        ICD-10-CM ICD-9-CM    1. Paroxysmal supraventricular tachycardia (HCC)  I47.1 427.0 AMB POC EKG ROUTINE W/ 12 LEADS, INTER & REP   2. SVT (supraventricular tachycardia) (HCC)  I47.1 427.89 AMB POC EKG ROUTINE W/ 12 LEADS, INTER & REP   3. WPW (Hunter-Parkinson-White syndrome)  I45.6 426.7 AMB POC EKG ROUTINE W/ 12 LEADS, INTER & REP   4. Atrioventricular reciprocating tachycardia (HCC)  I47.1 427.0    5. Palpitations  R00.2 785.1      Orders Placed This Encounter    AMB POC EKG ROUTINE W/ 12 LEADS, INTER & REP     Order Specific Question:   Reason for Exam:     Answer:   routine        Plan:   Linda Hdez is sp avrt ablation. He is in sinus rhythm and asymptomatic. Previously he had ablation of LLAP. This time, he had a right sided posteroseptal pathway that was ablated. Can f/u in one year. Thank you for allowing me to participate in Linda Hdez 's care.     Carlos Gilbert, MD, [facility] Or

## 2022-02-03 NOTE — PROGRESS NOTES
1. Have you been to the ER, urgent care clinic since your last visit? Hospitalized since your last visit? No.    2. Have you seen or consulted any other health care providers outside of the [address] [place] [address] since your last visit? Include any pap smears or colon screening.    No.      Chief Complaint   Patient presents with   [facility] Follow Up     f/u from Ablation- pt denies any cardiac symptoms- pt notes feeling good

## 2022-02-04 ENCOUNTER — TELEPHONE (OUTPATIENT)
Dept: CARDIOLOGY CLINIC | Age: 32
End: 2022-02-04

## 2022-02-04 NOTE — TELEPHONE ENCOUNTER
Patients return to work form date needs to be changed from the 4th to the 7th. The diability claim people told him this needs to be changed. Needs to take it to his job today by 12:30. Would like it faxed and also needs to  a copy asap.   Contact patient at [phone]

## 2022-02-04 NOTE — TELEPHONE ENCOUNTER
Called pt. Explained to him that medically he is cleared to go back to work as of today. Pt is not scheduled to go back to work until Monday, but we cannot put on the form that he is not able to return to work until Monday. Pt verbalized understanding. Kylah Patterson contacted regarding this also.

## 2022-06-25 ENCOUNTER — HOSPITAL ENCOUNTER (EMERGENCY)
Age: 32
Discharge: HOME | End: 2022-06-25
Attending: EMERGENCY MEDICINE
Payer: COMMERCIAL

## 2022-06-25 VITALS
HEART RATE: 74 BPM | DIASTOLIC BLOOD PRESSURE: 70 MMHG | BODY MASS INDEX: 30.5 KG/M2 | SYSTOLIC BLOOD PRESSURE: 113 MMHG | TEMPERATURE: 97.9 F | OXYGEN SATURATION: 98 % | WEIGHT: 237.66 LBS | RESPIRATION RATE: 18 BRPM | HEIGHT: 74 IN

## 2022-06-25 DIAGNOSIS — R00.2 PALPITATIONS: Primary | ICD-10-CM

## 2022-06-25 DIAGNOSIS — I45.6 WPW (WOLFF-PARKINSON-WHITE SYNDROME): ICD-10-CM

## 2022-06-25 LAB
ALBUMIN SERPL-MCNC: 4 G/DL (ref 3.5–5)
ALBUMIN/GLOB SERPL: 1.2 {RATIO} (ref 1.1–2.2)
ALP SERPL-CCNC: 75 U/L (ref 45–117)
ALT SERPL-CCNC: 27 U/L (ref 12–78)
ANION GAP SERPL CALC-SCNC: 4 MMOL/L (ref 5–15)
AST SERPL-CCNC: 17 U/L (ref 15–37)
BASOPHILS # BLD: 0.1 K/UL (ref 0–0.1)
BASOPHILS NFR BLD: 1 % (ref 0–1)
BILIRUB SERPL-MCNC: 1.6 MG/DL (ref 0.2–1)
BUN SERPL-MCNC: 10 MG/DL (ref 6–20)
BUN/CREAT SERPL: 8 (ref 12–20)
CALCIUM SERPL-MCNC: 9.1 MG/DL (ref 8.5–10.1)
CHLORIDE SERPL-SCNC: 108 MMOL/L (ref 97–108)
CO2 SERPL-SCNC: 27 MMOL/L (ref 21–32)
CREAT SERPL-MCNC: 1.22 MG/DL (ref 0.7–1.3)
DIFFERENTIAL METHOD BLD: NORMAL
EOSINOPHIL # BLD: 0.1 K/UL (ref 0–0.4)
EOSINOPHIL NFR BLD: 1 % (ref 0–7)
ERYTHROCYTE [DISTWIDTH] IN BLOOD BY AUTOMATED COUNT: 12.1 % (ref 11.5–14.5)
GLOBULIN SER CALC-MCNC: 3.4 G/DL (ref 2–4)
GLUCOSE SERPL-MCNC: 138 MG/DL (ref 65–100)
HCT VFR BLD AUTO: 40.5 % (ref 36.6–50.3)
HGB BLD-MCNC: 14.1 G/DL (ref 12.1–17)
IMM GRANULOCYTES # BLD AUTO: 0 K/UL (ref 0–0.04)
IMM GRANULOCYTES NFR BLD AUTO: 0 % (ref 0–0.5)
LYMPHOCYTES # BLD: 2.3 K/UL (ref 0.8–3.5)
LYMPHOCYTES NFR BLD: 27 % (ref 12–49)
MCH RBC QN AUTO: 29.4 PG (ref 26–34)
MCHC RBC AUTO-ENTMCNC: 34.8 G/DL (ref 30–36.5)
MCV RBC AUTO: 84.6 FL (ref 80–99)
MONOCYTES # BLD: 0.5 K/UL (ref 0–1)
MONOCYTES NFR BLD: 6 % (ref 5–13)
NEUTS SEG # BLD: 5.5 K/UL (ref 1.8–8)
NEUTS SEG NFR BLD: 65 % (ref 32–75)
NRBC # BLD: 0 K/UL (ref 0–0.01)
NRBC BLD-RTO: 0 PER 100 WBC
PLATELET # BLD AUTO: 315 K/UL (ref 150–400)
PMV BLD AUTO: 8.9 FL (ref 8.9–12.9)
POTASSIUM SERPL-SCNC: 3.5 MMOL/L (ref 3.5–5.1)
PROT SERPL-MCNC: 7.4 G/DL (ref 6.4–8.2)
RBC # BLD AUTO: 4.79 M/UL (ref 4.1–5.7)
SODIUM SERPL-SCNC: 139 MMOL/L (ref 136–145)
WBC # BLD AUTO: 8.4 K/UL (ref 4.1–11.1)

## 2022-06-25 PROCEDURE — 36415 COLL VENOUS BLD VENIPUNCTURE: CPT

## 2022-06-25 PROCEDURE — 80053 COMPREHEN METABOLIC PANEL: CPT

## 2022-06-25 PROCEDURE — 93005 ELECTROCARDIOGRAM TRACING: CPT

## 2022-06-25 PROCEDURE — 85025 COMPLETE CBC W/AUTO DIFF WBC: CPT

## 2022-06-25 PROCEDURE — 99284 EMERGENCY DEPT VISIT MOD MDM: CPT

## 2022-06-25 NOTE — ED NOTES
I have reviewed written and verbal discharge instructions with the patient. The patient verbalized understanding. I.v. removed, pt alert and ambulatory at discharge.

## 2022-06-25 NOTE — DISCHARGE INSTRUCTIONS
Your examination and EKG today are reassuring. I would not make any medication changes at this time, but I would recommend that you follow-up with Dr. Renee Fothergill.  I will send a message to let them know that you were here in the emergency department. You should return to the emergency department if you become very lightheaded or have strong chest pains or other passing out episodes. It was a pleasure taking care of you at [facility] today. We know that when you come to [facility], you are entrusting us with your health, comfort, and safety. Our physicians and nurses honor that trust, and we truly appreciate the opportunity to care for you and your loved ones. We also value your feedback. If you receive a survey about your Emergency Department experience today, please fill it out. We care about our patients' feedback, and we listen to what you have to say. Thank you!

## 2022-06-26 LAB
ATRIAL RATE: 68 BPM
CALCULATED P AXIS, ECG09: 59 DEGREES
CALCULATED R AXIS, ECG10: 74 DEGREES
CALCULATED T AXIS, ECG11: 34 DEGREES
DIAGNOSIS, 93000: NORMAL
P-R INTERVAL, ECG05: 184 MS
Q-T INTERVAL, ECG07: 394 MS
QRS DURATION, ECG06: 84 MS
QTC CALCULATION (BEZET), ECG08: 418 MS
VENTRICULAR RATE, ECG03: 68 BPM

## 2022-06-27 NOTE — ED PROVIDER NOTES
EMERGENCY DEPARTMENT HISTORY AND PHYSICAL EXAM           Date: 6/25/2022  Patient Name: Fito Trujillo  Patient Age and Sex: 28 y.o. male  MRN:  [id number]  CSN:  [id number]    History of Presenting Illness     Chief Complaint   Patient presents with    Syncope     Pt arrives ambulatory to triage, reports syncopal episode last weekend while at a wedding. Patient reports he has been having \"flutters\" in his chestx 1 week, reports hx of ablation. History Provided By: Patient    Ability to gather history was limited by:     HPI: Fito Trujillo, 28 y.o. male with history of atrial fibrillation, WPW, status post cardiac ablation, complains of syncopal episode 1 week ago. Since then he has been having intermittent fluttering sensation in his chest, palpitations. No chest pain. No nausea or vomiting. Symptoms are mild to moderate severity. He is currently not taking any medication since his ablation. Location:    Quality:      Severity:    Duration:   Timing:      Context:    Modifying factors:   Associated symptoms:     Past History      The patient's medical, surgical, and social history on file were reviewed by me today.      The family history was reviewed by me today and was non-contributory, unless otherwise specified below:    Past Medical History:  Past Medical History:   Diagnosis Date    [facility] rest [facility])     Atrial fibrillation ([address]     WPW (Hunter-Parkinson-White syndrome)        Past Surgical History:  Past Surgical History:   Procedure Laterality Date    [place] CARDIAC SURG PROCEDURE UNLIST  2014    Cardiac ablation ([facility] WPW)       Family History:  Family History   Problem Relation Age of Onset    Arrhythmia Father        Social History:  Social History     Tobacco Use    Smoking status: Never Smoker    Smokeless tobacco: Never Used   Vaping Use    Vaping Use: Never used   Substance Use Topics    Alcohol use: Yes     Comment: occasional    Drug use: Yes     Types: Marijuana Comment: occass       Current Medications:  No current facility-administered medications on file prior to encounter. No current outpatient medications on file prior to encounter. Allergies:  No Known Allergies  Review of Systems    A complete ROS was reviewed by me today and was negative, unless otherwise specified below:    Review of Systems   Constitutional: Negative for fatigue and fever. Respiratory: Negative for shortness of breath. Cardiovascular: Positive for palpitations. Negative for chest pain. Neurological: Positive for syncope. Negative for numbness and headaches. All other systems reviewed and are negative. Physical Exam   Vital Signs  No data found. Physical Exam  Vitals and nursing note reviewed. Constitutional:       General: He is not in acute distress. Appearance: Normal appearance. He is well-developed. He is not ill-appearing. HENT:      Head: Normocephalic and atraumatic. Eyes:      General:         Right eye: No discharge. Left eye: No discharge. Conjunctiva/sclera: Conjunctivae normal.   Cardiovascular:      Rate and Rhythm: Normal rate. Rhythm irregular. Heart sounds: Normal heart sounds. No murmur heard. Pulmonary:      Effort: Pulmonary effort is normal. No respiratory distress. Breath sounds: Normal breath sounds. No wheezing. Abdominal:      General: There is no distension. Palpations: Abdomen is soft. Tenderness: There is no abdominal tenderness. Musculoskeletal:         General: No deformity. Normal range of motion. Cervical back: Normal range of motion and neck supple. Skin:     General: Skin is warm and dry. Findings: No rash. Neurological:      General: No focal deficit present. Mental Status: He is alert and oriented to person, place, and time. Psychiatric:         Mood and Affect: Mood normal.         Behavior: Behavior normal.         Thought Content:  Thought content normal. Diagnostic Study Results   Labs  No results found for this or any previous visit (from the past 24 hour(s)). Radiologic Studies  No orders to display     CT Results  (Last 48 hours)    None        CXR Results  (Last 48 hours)    None          Billable Procedures   EKG reviewed by ED Physician in the absence of a cardiologist: Yes  EKG below was interpreted by Rodríguez Acevedo MD    EKG    Date/Time: 6/26/2022 8:42 PM  Performed by: Tobi Tillman MD  Authorized by: Tobi Tillman MD     ECG reviewed by ED Physician in the absence of a cardiologist: yes    Interpretation:     Interpretation: non-specific    Rate:     ECG rate assessment: normal    Rhythm:     Rhythm: sinus rhythm    Ectopy:     Ectopy: none    QRS:     QRS axis:  Normal  ST segments:     ST segments:  Normal  T waves:     T waves: normal          Medical Decision Making     I reviewed the patient's most recent Emergency Dept notes and diagnostic tests in formulating my MDM on today's visit. Provider Notes (Medical Decision Making):   81-year-old male presenting with palpitations for this past week, after a syncopal episode 1 week ago. Multiple prior ablations for SVT and WPW. On exam he is well-appearing, no distress, normal vital signs, heart rates consistently in the 60s to 70s. His laboratories are reassuring, and his EKG is unremarkable. No significant signs of WPW or arrhythmia. I considered restarting the patient on flecainide and/or metoprolol. However given the normalcy of his EKG and his heart rate that was consistently 75 or lower, I am recommending that he simply follow-up with his cardiologist Dr. Michelle Greenberg.    Records reviewed: I reviewed the records of patient's SVT ablation 5 months ago, notes from January 21, 2022 showing EP consult and recommendation for ablation. I also reviewed notes from patient's ED visit with very similar symptoms in December 18, 2021.   At that time cardiology was consulted and recommended low-dose metoprolol and flecainide to be restarted. Aimee Howard MD  8:41 PM  6/25/2022       Social History     Tobacco Use    Smoking status: Never Smoker    Smokeless tobacco: Never Used   Vaping Use    Vaping Use: Never used   Substance Use Topics    Alcohol use: Yes     Comment: occasional    Drug use: Yes     Types: Marijuana     Comment: occass       Medications Administered during ED course:  Medications - No data to display       Prescriptions from today's ED visit:  There are no discharge medications for this patient. Diagnosis and Disposition     Disposition:  Discharged    Clinical Impression:   1. Palpitations    2. WPW (Hunter-Parkinson-White syndrome)        Attestation:  I personally performed the services described in this documentation on this date 6/25/2022 for patient Girtha Officer. Aimee Howard MD        I was the first provider for this patient on this visit. To the best of my ability I reviewed relevant prior medical records, electrocardiograms, laboratories, and radiologic studies. The patient's presenting problems were discussed, and the patient was in agreement with the care plan formulated and outlined with them. Aimee Howard MD    Please note that this dictation was completed with Dragon voice recognition software. Quite often unanticipated grammatical, syntax, homophones, and other interpretive errors are inadvertently transcribed by the computer software. Please disregard these errors and excuse any errors that have escaped final proofreading.

## (undated) DEVICE — PINNACLE INTRODUCER SHEATH: Brand: PINNACLE

## (undated) DEVICE — CABLE RMFG RSPONS ELEC EXT RED --

## (undated) DEVICE — CABLE CATH L10FT YEL CONN 12-12 PIN ELECTROGRAM CONDUCTION

## (undated) DEVICE — REM POLYHESIVE ADULT PATIENT RETURN ELECTRODE: Brand: VALLEYLAB

## (undated) DEVICE — CATH QUAD 6F 2/5MM 120CM CRD

## (undated) DEVICE — TUBE SET IRR PUMP THERMALCOOL -- SMARTABLATE

## (undated) DEVICE — CABLE CATH L10FT RED PIN CONN 34-34 FOR THERMOCOOL

## (undated) DEVICE — MACROLYTE DISPERSIVE ELECTRODE: Brand: MACROLYTE

## (undated) DEVICE — PRESSURE MONITORING SET: Brand: TRUWAVE

## (undated) DEVICE — DRESSING HEMSTAT W3INXL2YD 1 PLY Z FLD QUIKCLOT CONTROL+

## (undated) DEVICE — CATH EP CRV 7F DUO 2/8 2M LG -- LIVEWIRE STRL

## (undated) DEVICE — CABLE EXT EPS B/T/BLK 150CM --

## (undated) DEVICE — HEART CATH-MRMC: Brand: MEDLINE INDUSTRIES, INC.

## (undated) DEVICE — CABLE RMFG SUPREME BPTPLR/QPLR --

## (undated) DEVICE — PATCH CARTO 3 EXT REF --

## (undated) DEVICE — CATHETER ABLAT 8FR L115CM 1-6-2MM SPC TIP 3.5MM FJ CRV

## (undated) DEVICE — CABLE RMFG EXT CATH --

## (undated) DEVICE — SHTH GUID 8.5F 22MM MED CRV -- CARTO VIZIGO

## (undated) DEVICE — CATH QUAD 6F 2/5/2 120CM JSN --